# Patient Record
Sex: FEMALE | Race: WHITE | Employment: OTHER | URBAN - METROPOLITAN AREA
[De-identification: names, ages, dates, MRNs, and addresses within clinical notes are randomized per-mention and may not be internally consistent; named-entity substitution may affect disease eponyms.]

---

## 2017-02-28 PROCEDURE — 81001 URINALYSIS AUTO W/SCOPE: CPT | Performed by: INTERNAL MEDICINE

## 2017-02-28 PROCEDURE — 87086 URINE CULTURE/COLONY COUNT: CPT | Performed by: INTERNAL MEDICINE

## 2017-02-28 PROCEDURE — 82607 VITAMIN B-12: CPT | Performed by: INTERNAL MEDICINE

## 2018-02-26 PROCEDURE — 87086 URINE CULTURE/COLONY COUNT: CPT | Performed by: INTERNAL MEDICINE

## 2018-02-26 PROCEDURE — 81001 URINALYSIS AUTO W/SCOPE: CPT | Performed by: INTERNAL MEDICINE

## 2018-11-14 PROCEDURE — 81001 URINALYSIS AUTO W/SCOPE: CPT | Performed by: INTERNAL MEDICINE

## 2018-11-14 PROCEDURE — 87086 URINE CULTURE/COLONY COUNT: CPT | Performed by: INTERNAL MEDICINE

## 2019-07-19 PROCEDURE — 87086 URINE CULTURE/COLONY COUNT: CPT | Performed by: INTERNAL MEDICINE

## 2020-12-18 PROBLEM — R42 DIZZY: Status: ACTIVE | Noted: 2020-12-18

## 2020-12-18 PROBLEM — I95.0 IDIOPATHIC HYPOTENSION: Status: ACTIVE | Noted: 2020-12-18

## 2020-12-21 PROBLEM — R42 DIZZY: Status: RESOLVED | Noted: 2020-12-18 | Resolved: 2020-12-21

## 2020-12-21 PROBLEM — I95.0 IDIOPATHIC HYPOTENSION: Status: RESOLVED | Noted: 2020-12-18 | Resolved: 2020-12-21

## 2021-08-04 PROBLEM — R93.1 AGATSTON CAC SCORE, >400: Status: ACTIVE | Noted: 2021-08-04

## 2021-08-04 PROBLEM — J42 CHRONIC BRONCHITIS (HCC): Status: ACTIVE | Noted: 2021-08-04

## 2021-08-04 PROBLEM — I71.40 ABDOMINAL AORTIC ANEURYSM (AAA) WITHOUT RUPTURE (HCC): Status: ACTIVE | Noted: 2021-08-04

## 2021-08-04 PROBLEM — I71.4 ABDOMINAL AORTIC ANEURYSM (AAA) WITHOUT RUPTURE (HCC): Status: ACTIVE | Noted: 2021-08-04

## 2021-08-04 PROBLEM — S32.010A CLOSED COMPRESSION FRACTURE OF L1 VERTEBRA (HCC): Status: ACTIVE | Noted: 2021-08-04

## 2021-08-04 PROBLEM — F33.9 DEPRESSION, RECURRENT (HCC): Status: ACTIVE | Noted: 2021-08-04

## 2021-08-04 PROBLEM — J42 CHRONIC BRONCHITIS (HCC): Status: RESOLVED | Noted: 2021-08-04 | Resolved: 2021-08-04

## 2021-08-04 PROBLEM — I71.40 ABDOMINAL AORTIC ANEURYSM (AAA) WITHOUT RUPTURE: Status: ACTIVE | Noted: 2021-08-04

## 2022-04-14 ENCOUNTER — OFFICE VISIT (OUTPATIENT)
Dept: INTERNAL MEDICINE CLINIC | Facility: CLINIC | Age: 82
End: 2022-04-14
Payer: MEDICARE

## 2022-04-14 VITALS
HEART RATE: 87 BPM | OXYGEN SATURATION: 96 % | SYSTOLIC BLOOD PRESSURE: 132 MMHG | TEMPERATURE: 98 F | HEIGHT: 64 IN | DIASTOLIC BLOOD PRESSURE: 82 MMHG | BODY MASS INDEX: 25.1 KG/M2 | WEIGHT: 147 LBS

## 2022-04-14 DIAGNOSIS — E78.5 DYSLIPIDEMIA: ICD-10-CM

## 2022-04-14 DIAGNOSIS — Z13.0 SCREENING FOR IRON DEFICIENCY ANEMIA: ICD-10-CM

## 2022-04-14 DIAGNOSIS — E03.9 ACQUIRED HYPOTHYROIDISM: ICD-10-CM

## 2022-04-14 DIAGNOSIS — G89.29 CHRONIC NECK PAIN: ICD-10-CM

## 2022-04-14 DIAGNOSIS — E55.9 VITAMIN D DEFICIENCY: ICD-10-CM

## 2022-04-14 DIAGNOSIS — I25.10 CORONARY ARTERY DISEASE INVOLVING NATIVE CORONARY ARTERY OF NATIVE HEART WITHOUT ANGINA PECTORIS: Primary | ICD-10-CM

## 2022-04-14 DIAGNOSIS — I95.1 ORTHOSTATIC HYPOTENSION: ICD-10-CM

## 2022-04-14 DIAGNOSIS — Z13.29 SCREENING FOR THYROID DISORDER: ICD-10-CM

## 2022-04-14 DIAGNOSIS — M25.649 STIFFNESS OF HAND JOINT, UNSPECIFIED LATERALITY: ICD-10-CM

## 2022-04-14 DIAGNOSIS — F33.9 DEPRESSION, RECURRENT (HCC): ICD-10-CM

## 2022-04-14 DIAGNOSIS — Z13.220 SCREENING FOR LIPOID DISORDERS: ICD-10-CM

## 2022-04-14 DIAGNOSIS — M54.2 CHRONIC NECK PAIN: ICD-10-CM

## 2022-04-14 PROCEDURE — 3075F SYST BP GE 130 - 139MM HG: CPT | Performed by: INTERNAL MEDICINE

## 2022-04-14 PROCEDURE — 3079F DIAST BP 80-89 MM HG: CPT | Performed by: INTERNAL MEDICINE

## 2022-04-14 PROCEDURE — 3008F BODY MASS INDEX DOCD: CPT | Performed by: INTERNAL MEDICINE

## 2022-04-14 PROCEDURE — 99205 OFFICE O/P NEW HI 60 MIN: CPT | Performed by: INTERNAL MEDICINE

## 2022-04-14 RX ORDER — ATORVASTATIN CALCIUM 20 MG/1
20 TABLET, FILM COATED ORAL DAILY
Qty: 90 TABLET | Refills: 3 | Status: SHIPPED | OUTPATIENT
Start: 2022-04-14

## 2022-04-14 RX ORDER — OXYBUTYNIN CHLORIDE 5 MG/1
5 TABLET ORAL NIGHTLY
Qty: 30 TABLET | Refills: 1 | Status: SHIPPED | OUTPATIENT
Start: 2022-04-14

## 2022-04-14 RX ORDER — OXYBUTYNIN CHLORIDE 5 MG/1
5 TABLET ORAL DAILY
Qty: 30 TABLET | Refills: 1 | Status: SHIPPED | OUTPATIENT
Start: 2022-04-14 | End: 2022-04-14

## 2022-04-14 RX ORDER — FLUDROCORTISONE ACETATE 0.1 MG/1
0.1 TABLET ORAL DAILY
Qty: 90 TABLET | Refills: 3 | Status: SHIPPED | OUTPATIENT
Start: 2022-04-14

## 2022-04-14 RX ORDER — MIDODRINE HYDROCHLORIDE 2.5 MG/1
2.5 TABLET ORAL DAILY
Qty: 90 TABLET | Refills: 1 | Status: SHIPPED | OUTPATIENT
Start: 2022-04-14 | End: 2022-04-14

## 2022-04-14 RX ORDER — MIDODRINE HYDROCHLORIDE 2.5 MG/1
2.5 TABLET ORAL
Qty: 180 TABLET | Refills: 1 | Status: SHIPPED | OUTPATIENT
Start: 2022-04-14

## 2022-05-04 ENCOUNTER — HOSPITAL ENCOUNTER (OUTPATIENT)
Dept: GENERAL RADIOLOGY | Facility: HOSPITAL | Age: 82
Discharge: HOME | End: 2022-05-04
Attending: INTERNAL MEDICINE
Payer: MEDICARE

## 2022-05-04 ENCOUNTER — LAB ENCOUNTER (OUTPATIENT)
Dept: LAB | Facility: HOSPITAL | Age: 82
End: 2022-05-04
Attending: INTERNAL MEDICINE
Payer: MEDICARE

## 2022-05-04 DIAGNOSIS — G89.29 CHRONIC NECK PAIN: ICD-10-CM

## 2022-05-04 DIAGNOSIS — E55.9 VITAMIN D DEFICIENCY: ICD-10-CM

## 2022-05-04 DIAGNOSIS — Z13.0 SCREENING FOR IRON DEFICIENCY ANEMIA: ICD-10-CM

## 2022-05-04 DIAGNOSIS — M25.649 STIFFNESS OF HAND JOINT, UNSPECIFIED LATERALITY: ICD-10-CM

## 2022-05-04 DIAGNOSIS — E78.5 DYSLIPIDEMIA: ICD-10-CM

## 2022-05-04 DIAGNOSIS — I95.1 ORTHOSTATIC HYPOTENSION: ICD-10-CM

## 2022-05-04 DIAGNOSIS — M54.2 CHRONIC NECK PAIN: ICD-10-CM

## 2022-05-04 LAB
ALBUMIN SERPL-MCNC: 3.8 G/DL (ref 3.4–5)
ALBUMIN/GLOB SERPL: 1.2 {RATIO} (ref 1–2)
ALP LIVER SERPL-CCNC: 58 U/L
ALT SERPL-CCNC: 22 U/L
ANION GAP SERPL CALC-SCNC: 6 MMOL/L (ref 0–18)
AST SERPL-CCNC: 19 U/L (ref 15–37)
BASOPHILS # BLD AUTO: 0.08 X10(3) UL (ref 0–0.2)
BASOPHILS NFR BLD AUTO: 1 %
BILIRUB SERPL-MCNC: 0.4 MG/DL (ref 0.1–2)
BUN BLD-MCNC: 11 MG/DL (ref 7–18)
BUN/CREAT SERPL: 10 (ref 10–20)
CALCIUM BLD-MCNC: 9 MG/DL (ref 8.5–10.1)
CHLORIDE SERPL-SCNC: 108 MMOL/L (ref 98–112)
CHOLEST SERPL-MCNC: 187 MG/DL (ref ?–200)
CO2 SERPL-SCNC: 28 MMOL/L (ref 21–32)
CREAT BLD-MCNC: 1.1 MG/DL
DEPRECATED RDW RBC AUTO: 51.8 FL (ref 35.1–46.3)
EOSINOPHIL # BLD AUTO: 0.17 X10(3) UL (ref 0–0.7)
EOSINOPHIL NFR BLD AUTO: 2.1 %
ERYTHROCYTE [DISTWIDTH] IN BLOOD BY AUTOMATED COUNT: 13.7 % (ref 11–15)
FASTING PATIENT LIPID ANSWER: YES
FASTING STATUS PATIENT QL REPORTED: YES
GLOBULIN PLAS-MCNC: 3.3 G/DL (ref 2.8–4.4)
GLUCOSE BLD-MCNC: 98 MG/DL (ref 70–99)
HCT VFR BLD AUTO: 47.5 %
HDLC SERPL-MCNC: 81 MG/DL (ref 40–59)
HGB BLD-MCNC: 14.7 G/DL
IMM GRANULOCYTES # BLD AUTO: 0.03 X10(3) UL (ref 0–1)
IMM GRANULOCYTES NFR BLD: 0.4 %
LDLC SERPL CALC-MCNC: 88 MG/DL (ref ?–100)
LYMPHOCYTES # BLD AUTO: 2.38 X10(3) UL (ref 1–4)
LYMPHOCYTES NFR BLD AUTO: 29.3 %
MCH RBC QN AUTO: 31.2 PG (ref 26–34)
MCHC RBC AUTO-ENTMCNC: 30.9 G/DL (ref 31–37)
MCV RBC AUTO: 100.8 FL
MONOCYTES # BLD AUTO: 0.74 X10(3) UL (ref 0.1–1)
MONOCYTES NFR BLD AUTO: 9.1 %
NEUTROPHILS # BLD AUTO: 4.71 X10 (3) UL (ref 1.5–7.7)
NEUTROPHILS # BLD AUTO: 4.71 X10(3) UL (ref 1.5–7.7)
NEUTROPHILS NFR BLD AUTO: 58.1 %
NONHDLC SERPL-MCNC: 106 MG/DL (ref ?–130)
OSMOLALITY SERPL CALC.SUM OF ELEC: 293 MOSM/KG (ref 275–295)
PLATELET # BLD AUTO: 279 10(3)UL (ref 150–450)
POTASSIUM SERPL-SCNC: 3.7 MMOL/L (ref 3.5–5.1)
PROT SERPL-MCNC: 7.1 G/DL (ref 6.4–8.2)
RBC # BLD AUTO: 4.71 X10(6)UL
RHEUMATOID FACT SERPL-ACNC: <10 IU/ML (ref ?–15)
SODIUM SERPL-SCNC: 142 MMOL/L (ref 136–145)
TRIGL SERPL-MCNC: 105 MG/DL (ref 30–149)
TSI SER-ACNC: 3.4 MIU/ML (ref 0.36–3.74)
VIT D+METAB SERPL-MCNC: 45 NG/ML (ref 30–100)
VLDLC SERPL CALC-MCNC: 17 MG/DL (ref 0–30)
WBC # BLD AUTO: 8.1 X10(3) UL (ref 4–11)

## 2022-05-04 PROCEDURE — 73130 X-RAY EXAM OF HAND: CPT | Performed by: INTERNAL MEDICINE

## 2022-05-04 PROCEDURE — 86038 ANTINUCLEAR ANTIBODIES: CPT

## 2022-05-04 PROCEDURE — 72040 X-RAY EXAM NECK SPINE 2-3 VW: CPT | Performed by: INTERNAL MEDICINE

## 2022-05-04 PROCEDURE — 86039 ANTINUCLEAR ANTIBODIES (ANA): CPT

## 2022-05-04 PROCEDURE — 85025 COMPLETE CBC W/AUTO DIFF WBC: CPT

## 2022-05-04 PROCEDURE — 80053 COMPREHEN METABOLIC PANEL: CPT

## 2022-05-04 PROCEDURE — 86431 RHEUMATOID FACTOR QUANT: CPT

## 2022-05-04 PROCEDURE — 84443 ASSAY THYROID STIM HORMONE: CPT | Performed by: INTERNAL MEDICINE

## 2022-05-04 PROCEDURE — 36415 COLL VENOUS BLD VENIPUNCTURE: CPT | Performed by: INTERNAL MEDICINE

## 2022-05-04 PROCEDURE — 80061 LIPID PANEL: CPT

## 2022-05-04 PROCEDURE — 82306 VITAMIN D 25 HYDROXY: CPT

## 2022-05-05 LAB — NUCLEAR IGG TITR SER IF: POSITIVE {TITER}

## 2022-05-07 LAB — ANA NUCLEOLAR TITR SER IF: 80 {TITER}

## 2022-05-10 ENCOUNTER — TELEPHONE (OUTPATIENT)
Dept: INTERNAL MEDICINE CLINIC | Facility: CLINIC | Age: 82
End: 2022-05-10

## 2022-11-26 RX ORDER — MIDODRINE HYDROCHLORIDE 2.5 MG/1
2.5 TABLET ORAL
Qty: 60 TABLET | Refills: 0 | Status: SHIPPED | OUTPATIENT
Start: 2022-11-26

## 2022-12-08 ENCOUNTER — OFFICE VISIT (OUTPATIENT)
Dept: INTERNAL MEDICINE CLINIC | Facility: CLINIC | Age: 82
End: 2022-12-08
Payer: MEDICARE

## 2022-12-08 VITALS
DIASTOLIC BLOOD PRESSURE: 76 MMHG | SYSTOLIC BLOOD PRESSURE: 108 MMHG | OXYGEN SATURATION: 98 % | TEMPERATURE: 98 F | HEART RATE: 80 BPM | HEIGHT: 64 IN | WEIGHT: 171.63 LBS | BODY MASS INDEX: 29.3 KG/M2

## 2022-12-08 DIAGNOSIS — R53.82 CHRONIC FATIGUE: ICD-10-CM

## 2022-12-08 DIAGNOSIS — E55.9 VITAMIN D DEFICIENCY: ICD-10-CM

## 2022-12-08 DIAGNOSIS — F33.9 DEPRESSION, RECURRENT (HCC): ICD-10-CM

## 2022-12-08 DIAGNOSIS — I95.1 ORTHOSTATIC HYPOTENSION: ICD-10-CM

## 2022-12-08 DIAGNOSIS — E78.5 DYSLIPIDEMIA: ICD-10-CM

## 2022-12-08 DIAGNOSIS — Z00.00 ENCOUNTER FOR ANNUAL HEALTH EXAMINATION: ICD-10-CM

## 2022-12-08 DIAGNOSIS — R53.81 PHYSICAL DECONDITIONING: ICD-10-CM

## 2022-12-08 DIAGNOSIS — M25.649 STIFFNESS OF HAND JOINT, UNSPECIFIED LATERALITY: ICD-10-CM

## 2022-12-08 DIAGNOSIS — G89.29 CHRONIC NECK PAIN: ICD-10-CM

## 2022-12-08 DIAGNOSIS — M54.2 CHRONIC NECK PAIN: ICD-10-CM

## 2022-12-08 DIAGNOSIS — Z78.0 POSTMENOPAUSAL: ICD-10-CM

## 2022-12-08 DIAGNOSIS — Z13.29 SCREENING FOR THYROID DISORDER: ICD-10-CM

## 2022-12-08 DIAGNOSIS — E03.9 ACQUIRED HYPOTHYROIDISM: ICD-10-CM

## 2022-12-08 DIAGNOSIS — Z13.0 SCREENING FOR DEFICIENCY ANEMIA: ICD-10-CM

## 2022-12-08 DIAGNOSIS — I25.10 CORONARY ARTERY DISEASE INVOLVING NATIVE CORONARY ARTERY OF NATIVE HEART WITHOUT ANGINA PECTORIS: ICD-10-CM

## 2022-12-08 DIAGNOSIS — Z00.00 ANNUAL PHYSICAL EXAM: Primary | ICD-10-CM

## 2022-12-08 DIAGNOSIS — N18.31 STAGE 3A CHRONIC KIDNEY DISEASE (HCC): ICD-10-CM

## 2022-12-08 RX ORDER — OXYBUTYNIN CHLORIDE 5 MG/1
10 TABLET ORAL NIGHTLY
Qty: 180 TABLET | Refills: 3 | Status: SHIPPED | OUTPATIENT
Start: 2022-12-08

## 2022-12-08 NOTE — PATIENT INSTRUCTIONS
- You were seen in clinic for regular annual check-up. We have ordered labs for you and we will call you with the results. Please obtain the bloodwork fasting for 10 hours. OK to drink water the day of your blood draw  -We are happy to hear that your orthostatic hypotension has improved. Continue with using compression stockings, getting up at a slower rate, use of fludrocortisone and midodrine. Be sure to take your second dose of midodrine before 2 PM  -Today, we focused on your fatigue. This can be due to multiple factors even outside of aging. We should rule out secondary causes of the blood work such as thyroid levels being low, vitamin deficiencies, decrease in blood counts. Your blood levels from the springtime all look good, lets make sure nothing is changed  - There is a clinical suspicion that inadequate sleep is the driving force for your fatigue. Lets increase your oxybutynin to 10 mg (2 tablets) once a day. The goal here is to decrease her urinary frequency so we have much better form of sleep  - We will send a referral to home health care as you would benefit from home health nursing with medication management, physical therapy, and Occupational Therapy. 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 Lets continue with    -Acetaminophen 500-650 mg every 4-6 hours as needed for pain relief  - Physical therapy can be considered in the future, which can be done in the home setting.  -For more severe pain, notify us as we can consider alternative medications    -Please continue checking your blood pressures at home and notify us if they are increasing   - Please continue following up with Dr. Aldo Bello of cardiology  - Please have your DEXA scan completed  - Please continue to eat a varied diet including recommended servings of vegetables, fruits, and low fat dairy.  Minimize high saturated fats (such as fast foods) and high sugar intake (such as soda)  - We recommend 150 minutes of moderate intensity exercise (brisk walking, swimming) weekly to maintain your current weight. Targeted weight loss will require more vigorous exercise or more than 150 minutes/week. Return to clinic in 3-6 months for follow-up    [address]   Tests on this list are recommended by your physician but may not be covered, or covered at this frequency, by your insurer. Please check with your insurance carrier before scheduling to verify coverage.    PREVENTATIVE SERVICES FREQUENCY &  COVERAGE DETAILS LAST COMPLETION DATE   Diabetes Screening    Fasting Blood Sugar /  Glucose    One screening every 12 months if never tested or if previously tested but not diagnosed with pre-diabetes   One screening every 6 months if diagnosed with pre-diabetes Lab Results   Component Value Date    GLUCOSE 84 12/22/2014    GLU 98 05/04/2022        Cardiovascular Disease Screening    Lipid Panel  Cholesterol  Lipoprotein (HDL)  Triglycerides Covered every 5 years for all Medicare beneficiaries without apparent signs or symptoms of cardiovascular disease Lab Results   Component Value Date    CHOLEST 187 05/04/2022    HDL 81 (H) 05/04/2022    LDL 88 05/04/2022    TRIG 105 05/04/2022         Electrocardiogram (EKG)   Covered if needed at Welcome to Medicare, and non-screening if indicated for medical reasons 12/22/2020      Ultrasound Screening for Abdominal Aortic Aneurysm (AAA) Covered once in a lifetime for one of the following risk factors    Men who are 73-68 years old and have ever smoked    Anyone with a family history -     Colorectal Cancer Screening  Covered for ages 52-80; only need ONE of the following:    Colonoscopy   Covered every 10 years    Covered every 2 years if patient is at high risk or previous colonoscopy was abnormal -    No recommendations at this time    Flexible Sigmoidoscopy   Covered every 4 years -    Fecal Occult Blood Test Covered annually -   Bone Density Screening Bone density screening    Covered every 2 years after age 72 if diagnosed with risk of osteoporosis or estrogen deficiency. Covered yearly for long-term glucocorticoid medication use (Steroids) No results found for this or any previous visit. DEXA Scan Never done   Pap and Pelvic    Pap   Covered every 2 years for women at normal risk;  Annually if at high risk -  No recommendations at this time    Chlamydia Annually if high risk -  No recommendations at this time   Screening Mammogram    Mammogram     Recommend annually for all female patients aged 36 and older    One baseline mammogram covered for patients aged 32-38 -    No recommendations at this time    Immunizations    Influenza Covered once per flu season  Please get every year -  Influenza Vaccine(1) due on 10/01/2022    Pneumococcal Each vaccine (Ybcnqzs64 & Yjxngfkty64) covered once after 72 Prevnar 13: 10/26/2014    Rtigfqtio80: 02/26/2018     No recommendations at this time    Hepatitis B One screening covered for patients with certain risk factors   -  No recommendations at this time    Tetanus Toxoid Not covered by Medicare Part B unless medically necessary (cut with metal); may be covered with your pharmacy prescription benefits -    Tetanus, Diptheria and Pertusis TD and TDaP Not covered by Medicare Part B -  No recommendations at this time    Zoster Not covered by Medicare Part B; may be covered with your pharmacy  prescription benefits -  Zoster Vaccines(1 of 2) Never done       Annual Monitoring of Persistent Medications (ACE/ARB, digoxin diuretics, anticonvulsants)    Potassium Annually Lab Results   Component Value Date    K 3.7 05/04/2022         Creatinine   Annually Lab Results   Component Value Date    CREATSERUM 1.10 (H) 05/04/2022         BUN Annually Lab Results   Component Value Date    BUN 11 05/04/2022       Drug Serum Conc Annually No results found for: DIGOXIN, DIG, VALP

## 2022-12-12 ENCOUNTER — TELEPHONE (OUTPATIENT)
Dept: INTERNAL MEDICINE CLINIC | Facility: CLINIC | Age: 82
End: 2022-12-12

## 2022-12-12 DIAGNOSIS — R53.81 PHYSICAL DECONDITIONING: ICD-10-CM

## 2022-12-12 DIAGNOSIS — I95.1 ORTHOSTATIC HYPOTENSION: Primary | ICD-10-CM

## 2022-12-12 DIAGNOSIS — R29.6 RECURRENT FALLS: ICD-10-CM

## 2022-12-12 NOTE — TELEPHONE ENCOUNTER
Jose Birch is calling back after talking with the patient's insurance.  Insurance informed Jose Birch that they work best with:    [facility]  [address] [place], [facility] [address]  Fax # [phone]   #  [phone]

## 2022-12-12 NOTE — TELEPHONE ENCOUNTER
Home health orders faxed to [facility] at [phone]. Confirmation received. Left voicemail for patient's grandson, Jose Birch that orders were faxed.

## 2022-12-12 NOTE — TELEPHONE ENCOUNTER
Pt's grandson Pancho Aldana called. Looks like the [facility] services that Dr. Alfonso Nissen ordered are not covered by her insurance. They do not cover [facility]. Pancho Aldana is going to contact her insurance company to find out which [facility] agency's they do cover and he will get back to us.

## 2023-01-06 ENCOUNTER — TELEPHONE (OUTPATIENT)
Dept: INTERNAL MEDICINE CLINIC | Facility: CLINIC | Age: 83
End: 2023-01-06

## 2023-01-06 NOTE — TELEPHONE ENCOUNTER
Received fax from [facility] requesting Face to face notes. Last OV note faxed to [phone]. Confirmation received.

## 2023-01-09 NOTE — TELEPHONE ENCOUNTER
Faxed back order for rolling walker to [facility] [phone]. Confirmation received.

## 2023-01-10 ENCOUNTER — TELEPHONE (OUTPATIENT)
Dept: INTERNAL MEDICINE CLINIC | Facility: CLINIC | Age: 83
End: 2023-01-10

## 2023-01-11 NOTE — TELEPHONE ENCOUNTER
DME order and office note from 12/8/22 faxed to Katherine at [phone]-confirmation received - to scanning

## 2023-01-12 RX ORDER — FLUDROCORTISONE ACETATE 0.1 MG/1
0.1 TABLET ORAL DAILY
Qty: 90 TABLET | Refills: 3 | OUTPATIENT
Start: 2023-01-12

## 2023-01-26 DIAGNOSIS — I25.10 CORONARY ARTERY DISEASE INVOLVING NATIVE CORONARY ARTERY OF NATIVE HEART WITHOUT ANGINA PECTORIS: ICD-10-CM

## 2023-01-27 RX ORDER — ATORVASTATIN CALCIUM 20 MG/1
20 TABLET, FILM COATED ORAL DAILY
Qty: 90 TABLET | Refills: 3 | OUTPATIENT
Start: 2023-01-27

## 2023-02-02 ENCOUNTER — MED REC SCAN ONLY (OUTPATIENT)
Dept: INTERNAL MEDICINE CLINIC | Facility: CLINIC | Age: 83
End: 2023-02-02

## 2023-05-01 DIAGNOSIS — I25.10 CORONARY ARTERY DISEASE INVOLVING NATIVE CORONARY ARTERY OF NATIVE HEART WITHOUT ANGINA PECTORIS: ICD-10-CM

## 2023-05-03 RX ORDER — ATORVASTATIN CALCIUM 20 MG/1
20 TABLET, FILM COATED ORAL DAILY
Qty: 90 TABLET | Refills: 3 | Status: SHIPPED | OUTPATIENT
Start: 2023-05-03

## 2023-05-03 RX ORDER — FLUDROCORTISONE ACETATE 0.1 MG/1
0.1 TABLET ORAL DAILY
Qty: 90 TABLET | Refills: 3 | Status: SHIPPED | OUTPATIENT
Start: 2023-05-03

## 2023-07-13 ENCOUNTER — TELEPHONE (OUTPATIENT)
Dept: INTERNAL MEDICINE CLINIC | Facility: CLINIC | Age: 83
End: 2023-07-13

## 2023-07-13 NOTE — TELEPHONE ENCOUNTER
Kayla Caputo is calling to schedule an appointment with Dr Samir Hernandez. Darius Neal states the patient has had a lack of energy when getting out of bed and refuses to try to get out of bed, worsen depression, and having accidents in bed. Darius Neal was hoping that the patient could see Dr Samir Hernandez some time soon. Darius Neal has agreed to scheduling an appointment with Dr Samir Hernandez on 8/17/2023 but would like to come in sooner. Darius Neal can be reached at [phone].

## 2023-07-13 NOTE — TELEPHONE ENCOUNTER
Spoke with Lidia Zavala states patient had diarrhea for 2-3 days a few days ago. Patient does not like to get out of beds and refused to take depression medication. Patient recently wearing depends, which is helping with the incontinence issues. Wants to discuss PT/OT for patient again as it helped her before. Lidia Zavala wants doctor to discuss her depression issues and medication again. Patient drinks Boost daily but not eating that much, has not lost any weight. Has not had any episodes of diarrhea in the last few days. Denies fever, blood in the stool, urinary frequency, urgency, nausea, vomiting, abdominal pain, hematuria. Patient lives alone and Lidia Zavala is also hopping to discuss [place]. Patient has appointment 08/17/23 Lidia Zavala thinks she can wait until then to discuss these issues. To Dr. Ant Connors as Beau Mobley for upcoming appointment.

## 2023-07-13 NOTE — TELEPHONE ENCOUNTER
Ruth Crouch called back. He asks that we disregard the below request. Patient is okay to wait until August to see Dr Trevin Rincon.

## 2023-08-14 ENCOUNTER — TELEPHONE (OUTPATIENT)
Dept: INTERNAL MEDICINE CLINIC | Facility: CLINIC | Age: 83
End: 2023-08-14

## 2023-08-14 DIAGNOSIS — R82.90 ABNORMAL URINE: Primary | ICD-10-CM

## 2023-08-14 NOTE — TELEPHONE ENCOUNTER
Spoke with Geri Lopez per HIPAA states patient had unwitnessed  fall on 8/11 and was on the floor for about 12 hours. He states patient told him she did not hit her head or lose consciousness. She said she tripped over her  shoe and landed on her bottom on carpeted floor. Paramedics were called but patient refused to go to ER. Geri Lopez states patient said she did not get dizzy or lightheaded prior to falling. He also, states patient gets confused and is not always a reliable historian. Patient lives alone. Geri Lopez states patient is not complaining of any urinary issues at this time. He states that patient's urine has a strong odor and he noticed that she is using more pull-up that normal. Patient has not had any fever, hematuria, no c/o back/flank pain, new confusion. Geri Lopez asking if UA can be ordered for patient prior to 8/17 appointment with Dr. Meera Mcdonnell. Recommended ER for evaluation of fall as patient is poor historian, on the floor for 12 hours, and strong urinary odor. Geri Lopez states he is a critical care nurse and he does not think patient needs ER at this time. He states he will keep an eye on her for any decline. To Dr. Stanton Mann please advise.  UA pended    [address]

## 2023-08-14 NOTE — TELEPHONE ENCOUNTER
Kim Gant called  Dallas Carranza reports patient had a fall on Friday Aug 11  Pt was on the floor for more than 12 hours / paramedics were called   Vitals were taken, pt did not hit her head    Pt has become more incontinent / urine has an odor   Asks for order to have a urine test done prior to her appt on 8/17 with Dr Adan Robertson would like obtain urine sample at pt's home and bring to the [place] [id number]     Call back # [phone]

## 2023-08-14 NOTE — TELEPHONE ENCOUNTER
Please let Tomás Sahu know that I received his message in the absence of Dr. Logan Hernandez. I appreciate his evaluation. I did order both urinalysis and urine culture so we can get a complete picture of what may be happening with her urine if anything. .  Orders in place.

## 2023-08-16 NOTE — PATIENT INSTRUCTIONS
You are seen in clinic today for follow-up. Today, we did focus on your recent fall which may be due to multiple factors including fatigue, possibly urinary infection.  - We should check blood test as part of a general follow-up as well as prior to your next physical examination  - We did also discuss depression as a cause of your chronic fatigue. We should consider starting medications:    Effexor 37.5 mg once a day. May take 4-6 weeks to take full effect  - Monitor for any side effects  - Please check in with us via [employer] in about 3-4 weeks    For your orthostatic hypotension, we are increasing to 25 mg twice a day  - Carefully check your blood pressures periodically at home, notify us if they are increasing his midodrine can increase blood pressure even during times of rest  - We are checking some fasting blood work. This should be done between the hours of 8 AM-9 AM fasting for 10 hours.   Specifically we are checking your cholesterol and vitamin levels    We are setting up home health care for you, PT/OT to assist with your balance and chronic neck pain.  -We will ask for assistance with shower aid as well    - Return to clinic in 6-8 weeks for Medicare annual physical examination

## 2023-08-17 ENCOUNTER — LAB ENCOUNTER (OUTPATIENT)
Dept: LAB | Age: 83
End: 2023-08-17
Attending: INTERNAL MEDICINE
Payer: MEDICARE

## 2023-08-17 ENCOUNTER — TELEPHONE (OUTPATIENT)
Dept: INTERNAL MEDICINE CLINIC | Facility: CLINIC | Age: 83
End: 2023-08-17

## 2023-08-17 ENCOUNTER — OFFICE VISIT (OUTPATIENT)
Dept: INTERNAL MEDICINE CLINIC | Facility: CLINIC | Age: 83
End: 2023-08-17

## 2023-08-17 VITALS
SYSTOLIC BLOOD PRESSURE: 110 MMHG | BODY MASS INDEX: 25.27 KG/M2 | TEMPERATURE: 98 F | OXYGEN SATURATION: 96 % | WEIGHT: 148 LBS | HEIGHT: 64 IN | DIASTOLIC BLOOD PRESSURE: 62 MMHG | HEART RATE: 82 BPM

## 2023-08-17 DIAGNOSIS — R53.82 CHRONIC FATIGUE: ICD-10-CM

## 2023-08-17 DIAGNOSIS — Z12.31 ENCOUNTER FOR SCREENING MAMMOGRAM FOR MALIGNANT NEOPLASM OF BREAST: ICD-10-CM

## 2023-08-17 DIAGNOSIS — E78.5 DYSLIPIDEMIA: ICD-10-CM

## 2023-08-17 DIAGNOSIS — R29.6 RECURRENT FALLS: Primary | ICD-10-CM

## 2023-08-17 DIAGNOSIS — Z13.29 SCREENING FOR THYROID DISORDER: ICD-10-CM

## 2023-08-17 DIAGNOSIS — I95.1 ORTHOSTATIC HYPOTENSION: ICD-10-CM

## 2023-08-17 DIAGNOSIS — N18.31 STAGE 3A CHRONIC KIDNEY DISEASE (HCC): ICD-10-CM

## 2023-08-17 DIAGNOSIS — E55.9 VITAMIN D DEFICIENCY: ICD-10-CM

## 2023-08-17 DIAGNOSIS — E03.9 ACQUIRED HYPOTHYROIDISM: ICD-10-CM

## 2023-08-17 DIAGNOSIS — R82.90 ABNORMAL URINE: ICD-10-CM

## 2023-08-17 DIAGNOSIS — Z13.0 SCREENING FOR DEFICIENCY ANEMIA: ICD-10-CM

## 2023-08-17 DIAGNOSIS — Z00.00 ANNUAL PHYSICAL EXAM: ICD-10-CM

## 2023-08-17 DIAGNOSIS — F33.9 DEPRESSION, RECURRENT (HCC): ICD-10-CM

## 2023-08-17 DIAGNOSIS — Z13.1 SCREENING FOR DIABETES MELLITUS: ICD-10-CM

## 2023-08-17 DIAGNOSIS — N39.3 STRESS INCONTINENCE OF URINE: ICD-10-CM

## 2023-08-17 DIAGNOSIS — R29.6 RECURRENT FALLS: ICD-10-CM

## 2023-08-17 LAB
ALBUMIN SERPL-MCNC: 3.7 G/DL (ref 3.4–5)
ALBUMIN/GLOB SERPL: 1 {RATIO} (ref 1–2)
ALP LIVER SERPL-CCNC: 56 U/L
ALT SERPL-CCNC: 29 U/L
ANION GAP SERPL CALC-SCNC: 7 MMOL/L (ref 0–18)
AST SERPL-CCNC: 31 U/L (ref 15–37)
BASOPHILS # BLD AUTO: 0.08 X10(3) UL (ref 0–0.2)
BASOPHILS NFR BLD AUTO: 1 %
BILIRUB SERPL-MCNC: 0.7 MG/DL (ref 0.1–2)
BUN BLD-MCNC: 10 MG/DL (ref 7–18)
BUN/CREAT SERPL: 8.8 (ref 10–20)
CALCIUM BLD-MCNC: 10.1 MG/DL (ref 8.5–10.1)
CHLORIDE SERPL-SCNC: 108 MMOL/L (ref 98–112)
CHOLEST SERPL-MCNC: 206 MG/DL (ref ?–200)
CO2 SERPL-SCNC: 26 MMOL/L (ref 21–32)
CORTIS SERPL-MCNC: 16.8 UG/DL
CREAT BLD-MCNC: 1.14 MG/DL
DEPRECATED RDW RBC AUTO: 48.9 FL (ref 35.1–46.3)
EGFRCR SERPLBLD CKD-EPI 2021: 48 ML/MIN/1.73M2 (ref 60–?)
EOSINOPHIL # BLD AUTO: 0.16 X10(3) UL (ref 0–0.7)
EOSINOPHIL NFR BLD AUTO: 2 %
ERYTHROCYTE [DISTWIDTH] IN BLOOD BY AUTOMATED COUNT: 13.7 % (ref 11–15)
FASTING PATIENT LIPID ANSWER: YES
FASTING STATUS PATIENT QL REPORTED: YES
GLOBULIN PLAS-MCNC: 3.6 G/DL (ref 2.8–4.4)
GLUCOSE BLD-MCNC: 96 MG/DL (ref 70–99)
HCT VFR BLD AUTO: 48.4 %
HDLC SERPL-MCNC: 70 MG/DL (ref 40–59)
HGB BLD-MCNC: 15.6 G/DL
IMM GRANULOCYTES # BLD AUTO: 0.02 X10(3) UL (ref 0–1)
IMM GRANULOCYTES NFR BLD: 0.3 %
LDLC SERPL CALC-MCNC: 112 MG/DL (ref ?–100)
LYMPHOCYTES # BLD AUTO: 2.09 X10(3) UL (ref 1–4)
LYMPHOCYTES NFR BLD AUTO: 26.2 %
MCH RBC QN AUTO: 31.2 PG (ref 26–34)
MCHC RBC AUTO-ENTMCNC: 32.2 G/DL (ref 31–37)
MCV RBC AUTO: 96.8 FL
MONOCYTES # BLD AUTO: 0.68 X10(3) UL (ref 0.1–1)
MONOCYTES NFR BLD AUTO: 8.5 %
NEUTROPHILS # BLD AUTO: 4.95 X10 (3) UL (ref 1.5–7.7)
NEUTROPHILS # BLD AUTO: 4.95 X10(3) UL (ref 1.5–7.7)
NEUTROPHILS NFR BLD AUTO: 62 %
NONHDLC SERPL-MCNC: 136 MG/DL (ref ?–130)
OSMOLALITY SERPL CALC.SUM OF ELEC: 291 MOSM/KG (ref 275–295)
PLATELET # BLD AUTO: 249 10(3)UL (ref 150–450)
POTASSIUM SERPL-SCNC: 4 MMOL/L (ref 3.5–5.1)
PROT SERPL-MCNC: 7.3 G/DL (ref 6.4–8.2)
RBC # BLD AUTO: 5 X10(6)UL
SODIUM SERPL-SCNC: 141 MMOL/L (ref 136–145)
T4 FREE SERPL-MCNC: 1 NG/DL (ref 0.8–1.7)
TRIGL SERPL-MCNC: 140 MG/DL (ref 30–149)
TSI SER-ACNC: 3.86 MIU/ML (ref 0.36–3.74)
VIT B12 SERPL-MCNC: 1861 PG/ML (ref 193–986)
VIT D+METAB SERPL-MCNC: 66.3 NG/ML (ref 30–100)
VLDLC SERPL CALC-MCNC: 24 MG/DL (ref 0–30)
WBC # BLD AUTO: 8 X10(3) UL (ref 4–11)

## 2023-08-17 PROCEDURE — 84439 ASSAY OF FREE THYROXINE: CPT

## 2023-08-17 PROCEDURE — 82607 VITAMIN B-12: CPT

## 2023-08-17 PROCEDURE — 3008F BODY MASS INDEX DOCD: CPT | Performed by: INTERNAL MEDICINE

## 2023-08-17 PROCEDURE — 80053 COMPREHEN METABOLIC PANEL: CPT

## 2023-08-17 PROCEDURE — 80061 LIPID PANEL: CPT

## 2023-08-17 PROCEDURE — 85025 COMPLETE CBC W/AUTO DIFF WBC: CPT

## 2023-08-17 PROCEDURE — 99215 OFFICE O/P EST HI 40 MIN: CPT | Performed by: INTERNAL MEDICINE

## 2023-08-17 PROCEDURE — 82306 VITAMIN D 25 HYDROXY: CPT

## 2023-08-17 PROCEDURE — 1159F MED LIST DOCD IN RCRD: CPT | Performed by: INTERNAL MEDICINE

## 2023-08-17 PROCEDURE — 82533 TOTAL CORTISOL: CPT

## 2023-08-17 PROCEDURE — 84443 ASSAY THYROID STIM HORMONE: CPT

## 2023-08-17 PROCEDURE — 36415 COLL VENOUS BLD VENIPUNCTURE: CPT

## 2023-08-17 PROCEDURE — 3078F DIAST BP <80 MM HG: CPT | Performed by: INTERNAL MEDICINE

## 2023-08-17 PROCEDURE — 1125F AMNT PAIN NOTED PAIN PRSNT: CPT | Performed by: INTERNAL MEDICINE

## 2023-08-17 PROCEDURE — 1160F RVW MEDS BY RX/DR IN RCRD: CPT | Performed by: INTERNAL MEDICINE

## 2023-08-17 PROCEDURE — 3074F SYST BP LT 130 MM HG: CPT | Performed by: INTERNAL MEDICINE

## 2023-08-17 RX ORDER — LEVOTHYROXINE SODIUM 88 UG/1
88 TABLET ORAL
Qty: 90 TABLET | Refills: 3 | Status: SHIPPED | OUTPATIENT
Start: 2023-08-17

## 2023-08-17 RX ORDER — VENLAFAXINE HYDROCHLORIDE 37.5 MG/1
37.5 CAPSULE, EXTENDED RELEASE ORAL DAILY
Qty: 90 CAPSULE | Refills: 3 | Status: SHIPPED | OUTPATIENT
Start: 2023-08-17

## 2023-08-17 RX ORDER — MIDODRINE HYDROCHLORIDE 5 MG/1
5 TABLET ORAL
Qty: 180 TABLET | Refills: 3 | Status: SHIPPED | OUTPATIENT
Start: 2023-08-17

## 2023-08-17 NOTE — TELEPHONE ENCOUNTER
Please notify the patient or the patient's grandson Ronit Huber that the blood work did show low thyroid levels that we need to adjust her medications:    Increase to levothyroxine 88 mcg once a day    We will need to repeat some labs at the next visit to ensure thyroid levels normalize. Hopefully this will help with her energy levels over time.

## 2023-08-17 NOTE — TELEPHONE ENCOUNTER
Spoke to patients lillian woods per HIPAA and advised per MD message below, palak verbalized understanding and agreed to changes.

## 2023-08-24 ENCOUNTER — TELEPHONE (OUTPATIENT)
Dept: INTERNAL MEDICINE CLINIC | Facility: CLINIC | Age: 83
End: 2023-08-24

## 2023-08-24 NOTE — TELEPHONE ENCOUNTER
Mingo Ghotra called, pt saw Dr Zoey Daily on 8/17     [facility] orders were not received     Asks that they are re sent to [address] [place] [address] [facility] [phone]

## 2023-08-25 ENCOUNTER — HOSPITAL ENCOUNTER (OUTPATIENT)
Facility: HOSPITAL | Age: 83
Setting detail: OBSERVATION
Discharge: NURSING, REHAB OR LONG-TERM CARE | End: 2023-08-28
Attending: STUDENT IN AN ORGANIZED HEALTH CARE EDUCATION/TRAINING PROGRAM | Admitting: INTERNAL MEDICINE
Payer: MEDICARE

## 2023-08-25 ENCOUNTER — APPOINTMENT (OUTPATIENT)
Dept: GENERAL RADIOLOGY | Facility: HOSPITAL | Age: 83
End: 2023-08-25
Attending: INTERNAL MEDICINE
Payer: MEDICARE

## 2023-08-25 ENCOUNTER — APPOINTMENT (OUTPATIENT)
Dept: CV DIAGNOSTICS | Facility: HOSPITAL | Age: 83
End: 2023-08-25
Attending: INTERNAL MEDICINE
Payer: MEDICARE

## 2023-08-25 ENCOUNTER — APPOINTMENT (OUTPATIENT)
Dept: GENERAL RADIOLOGY | Facility: HOSPITAL | Age: 83
End: 2023-08-25
Attending: STUDENT IN AN ORGANIZED HEALTH CARE EDUCATION/TRAINING PROGRAM
Payer: MEDICARE

## 2023-08-25 DIAGNOSIS — R53.1 WEAKNESS GENERALIZED: ICD-10-CM

## 2023-08-25 DIAGNOSIS — R19.7 DIARRHEA, UNSPECIFIED TYPE: Primary | ICD-10-CM

## 2023-08-25 DIAGNOSIS — I95.1 ORTHOSTATIC HYPOTENSION: ICD-10-CM

## 2023-08-25 DIAGNOSIS — R62.7 FAILURE TO THRIVE IN ADULT: ICD-10-CM

## 2023-08-25 LAB
ALBUMIN SERPL-MCNC: 3.5 G/DL (ref 3.4–5)
ALBUMIN/GLOB SERPL: 0.9 {RATIO} (ref 1–2)
ALP LIVER SERPL-CCNC: 60 U/L
ALT SERPL-CCNC: 25 U/L
ANION GAP SERPL CALC-SCNC: 5 MMOL/L (ref 0–18)
AST SERPL-CCNC: 26 U/L (ref 15–37)
BASOPHILS # BLD AUTO: 0.06 X10(3) UL (ref 0–0.2)
BASOPHILS NFR BLD AUTO: 0.7 %
BILIRUB SERPL-MCNC: 0.6 MG/DL (ref 0.1–2)
BILIRUB UR QL: NEGATIVE
BUN BLD-MCNC: 9 MG/DL (ref 7–18)
BUN/CREAT SERPL: 7.6 (ref 10–20)
C DIFF TOX B STL QL: NEGATIVE
CALCIUM BLD-MCNC: 9.2 MG/DL (ref 8.5–10.1)
CHLORIDE SERPL-SCNC: 109 MMOL/L (ref 98–112)
CO2 SERPL-SCNC: 28 MMOL/L (ref 21–32)
CREAT BLD-MCNC: 1.18 MG/DL
DEPRECATED RDW RBC AUTO: 48 FL (ref 35.1–46.3)
EGFRCR SERPLBLD CKD-EPI 2021: 46 ML/MIN/1.73M2 (ref 60–?)
EOSINOPHIL # BLD AUTO: 0.13 X10(3) UL (ref 0–0.7)
EOSINOPHIL NFR BLD AUTO: 1.4 %
ERYTHROCYTE [DISTWIDTH] IN BLOOD BY AUTOMATED COUNT: 13.4 % (ref 11–15)
GLOBULIN PLAS-MCNC: 3.7 G/DL (ref 2.8–4.4)
GLUCOSE BLD-MCNC: 107 MG/DL (ref 70–99)
GLUCOSE UR-MCNC: NORMAL MG/DL
HCT VFR BLD AUTO: 49.4 %
HGB BLD-MCNC: 16 G/DL
HYALINE CASTS #/AREA URNS AUTO: PRESENT /LPF
IMM GRANULOCYTES # BLD AUTO: 0.03 X10(3) UL (ref 0–1)
IMM GRANULOCYTES NFR BLD: 0.3 %
KETONES UR-MCNC: 10 MG/DL
LEUKOCYTE ESTERASE UR QL STRIP.AUTO: 25
LYMPHOCYTES # BLD AUTO: 2.57 X10(3) UL (ref 1–4)
LYMPHOCYTES NFR BLD AUTO: 28.3 %
MAGNESIUM SERPL-MCNC: 2.1 MG/DL (ref 1.6–2.6)
MCH RBC QN AUTO: 31.2 PG (ref 26–34)
MCHC RBC AUTO-ENTMCNC: 32.4 G/DL (ref 31–37)
MCV RBC AUTO: 96.3 FL
MONOCYTES # BLD AUTO: 0.89 X10(3) UL (ref 0.1–1)
MONOCYTES NFR BLD AUTO: 9.8 %
NEUTROPHILS # BLD AUTO: 5.41 X10 (3) UL (ref 1.5–7.7)
NEUTROPHILS # BLD AUTO: 5.41 X10(3) UL (ref 1.5–7.7)
NEUTROPHILS NFR BLD AUTO: 59.5 %
NITRITE UR QL STRIP.AUTO: NEGATIVE
OSMOLALITY SERPL CALC.SUM OF ELEC: 293 MOSM/KG (ref 275–295)
PH UR: 5.5 [PH] (ref 5–8)
PLATELET # BLD AUTO: 265 10(3)UL (ref 150–450)
POTASSIUM SERPL-SCNC: 3.6 MMOL/L (ref 3.5–5.1)
PROT SERPL-MCNC: 7.2 G/DL (ref 6.4–8.2)
PROT UR-MCNC: 20 MG/DL
RBC # BLD AUTO: 5.13 X10(6)UL
SODIUM SERPL-SCNC: 142 MMOL/L (ref 136–145)
SP GR UR STRIP: 1.01 (ref 1–1.03)
UROBILINOGEN UR STRIP-ACNC: NORMAL
WBC # BLD AUTO: 9.1 X10(3) UL (ref 4–11)

## 2023-08-25 PROCEDURE — 93306 TTE W/DOPPLER COMPLETE: CPT | Performed by: INTERNAL MEDICINE

## 2023-08-25 PROCEDURE — 99223 1ST HOSP IP/OBS HIGH 75: CPT | Performed by: INTERNAL MEDICINE

## 2023-08-25 PROCEDURE — 71045 X-RAY EXAM CHEST 1 VIEW: CPT | Performed by: STUDENT IN AN ORGANIZED HEALTH CARE EDUCATION/TRAINING PROGRAM

## 2023-08-25 PROCEDURE — 72040 X-RAY EXAM NECK SPINE 2-3 VW: CPT | Performed by: INTERNAL MEDICINE

## 2023-08-25 RX ORDER — BISACODYL 10 MG
10 SUPPOSITORY, RECTAL RECTAL
Status: DISCONTINUED | OUTPATIENT
Start: 2023-08-25 | End: 2023-08-28

## 2023-08-25 RX ORDER — LEVOTHYROXINE SODIUM 88 UG/1
88 TABLET ORAL
Status: DISCONTINUED | OUTPATIENT
Start: 2023-08-25 | End: 2023-08-28

## 2023-08-25 RX ORDER — ENEMA 19; 7 G/133ML; G/133ML
1 ENEMA RECTAL ONCE AS NEEDED
Status: DISCONTINUED | OUTPATIENT
Start: 2023-08-25 | End: 2023-08-27

## 2023-08-25 RX ORDER — ONDANSETRON 2 MG/ML
4 INJECTION INTRAMUSCULAR; INTRAVENOUS EVERY 6 HOURS PRN
Status: DISCONTINUED | OUTPATIENT
Start: 2023-08-25 | End: 2023-08-28

## 2023-08-25 RX ORDER — MIDODRINE HYDROCHLORIDE 5 MG/1
5 TABLET ORAL
Status: DISCONTINUED | OUTPATIENT
Start: 2023-08-25 | End: 2023-08-28

## 2023-08-25 RX ORDER — HEPARIN SODIUM 5000 [USP'U]/ML
5000 INJECTION, SOLUTION INTRAVENOUS; SUBCUTANEOUS EVERY 12 HOURS SCHEDULED
Status: DISCONTINUED | OUTPATIENT
Start: 2023-08-25 | End: 2023-08-28

## 2023-08-25 RX ORDER — VENLAFAXINE HYDROCHLORIDE 37.5 MG/1
37.5 CAPSULE, EXTENDED RELEASE ORAL DAILY
Status: DISCONTINUED | OUTPATIENT
Start: 2023-08-25 | End: 2023-08-28

## 2023-08-25 RX ORDER — SENNOSIDES 8.6 MG
17.2 TABLET ORAL NIGHTLY PRN
Status: DISCONTINUED | OUTPATIENT
Start: 2023-08-25 | End: 2023-08-28

## 2023-08-25 RX ORDER — GABAPENTIN 100 MG/1
100 CAPSULE ORAL 3 TIMES DAILY
Status: DISCONTINUED | OUTPATIENT
Start: 2023-08-25 | End: 2023-08-28

## 2023-08-25 RX ORDER — SODIUM CHLORIDE 9 MG/ML
INJECTION, SOLUTION INTRAVENOUS CONTINUOUS
Status: DISCONTINUED | OUTPATIENT
Start: 2023-08-25 | End: 2023-08-26

## 2023-08-25 RX ORDER — ATORVASTATIN CALCIUM 20 MG/1
20 TABLET, FILM COATED ORAL DAILY
Status: DISCONTINUED | OUTPATIENT
Start: 2023-08-25 | End: 2023-08-28

## 2023-08-25 RX ORDER — MELATONIN
3 NIGHTLY PRN
Status: DISCONTINUED | OUTPATIENT
Start: 2023-08-25 | End: 2023-08-28

## 2023-08-25 RX ORDER — POLYETHYLENE GLYCOL 3350 17 G/17G
17 POWDER, FOR SOLUTION ORAL DAILY PRN
Status: DISCONTINUED | OUTPATIENT
Start: 2023-08-25 | End: 2023-08-28

## 2023-08-25 RX ORDER — METOCLOPRAMIDE HYDROCHLORIDE 5 MG/ML
5 INJECTION INTRAMUSCULAR; INTRAVENOUS EVERY 8 HOURS PRN
Status: DISCONTINUED | OUTPATIENT
Start: 2023-08-25 | End: 2023-08-28

## 2023-08-25 RX ORDER — ACETAMINOPHEN 500 MG
500 TABLET ORAL EVERY 4 HOURS PRN
Status: DISCONTINUED | OUTPATIENT
Start: 2023-08-25 | End: 2023-08-28

## 2023-08-25 RX ORDER — FLUDROCORTISONE ACETATE 0.1 MG/1
0.1 TABLET ORAL DAILY
Status: DISCONTINUED | OUTPATIENT
Start: 2023-08-25 | End: 2023-08-28

## 2023-08-25 NOTE — TELEPHONE ENCOUNTER
Spoke to Marie and advised that referral was completed and authorized , and that we faxed it over today and received confirmation.

## 2023-08-26 LAB
ANION GAP SERPL CALC-SCNC: 6 MMOL/L (ref 0–18)
BASOPHILS # BLD AUTO: 0.07 X10(3) UL (ref 0–0.2)
BASOPHILS NFR BLD AUTO: 0.9 %
BUN BLD-MCNC: 10 MG/DL (ref 7–18)
BUN/CREAT SERPL: 12.2 (ref 10–20)
CALCIUM BLD-MCNC: 8 MG/DL (ref 8.5–10.1)
CHLORIDE SERPL-SCNC: 115 MMOL/L (ref 98–112)
CO2 SERPL-SCNC: 24 MMOL/L (ref 21–32)
CREAT BLD-MCNC: 0.82 MG/DL
DEPRECATED RDW RBC AUTO: 48.3 FL (ref 35.1–46.3)
EGFRCR SERPLBLD CKD-EPI 2021: 71 ML/MIN/1.73M2 (ref 60–?)
EOSINOPHIL # BLD AUTO: 0.2 X10(3) UL (ref 0–0.7)
EOSINOPHIL NFR BLD AUTO: 2.5 %
ERYTHROCYTE [DISTWIDTH] IN BLOOD BY AUTOMATED COUNT: 13.5 % (ref 11–15)
GLUCOSE BLD-MCNC: 91 MG/DL (ref 70–99)
HCT VFR BLD AUTO: 38.8 %
HGB BLD-MCNC: 12.9 G/DL
IMM GRANULOCYTES # BLD AUTO: 0.02 X10(3) UL (ref 0–1)
IMM GRANULOCYTES NFR BLD: 0.3 %
LYMPHOCYTES # BLD AUTO: 3.14 X10(3) UL (ref 1–4)
LYMPHOCYTES NFR BLD AUTO: 39.9 %
MCH RBC QN AUTO: 31.9 PG (ref 26–34)
MCHC RBC AUTO-ENTMCNC: 33.2 G/DL (ref 31–37)
MCV RBC AUTO: 95.8 FL
MONOCYTES # BLD AUTO: 0.91 X10(3) UL (ref 0.1–1)
MONOCYTES NFR BLD AUTO: 11.6 %
NEUTROPHILS # BLD AUTO: 3.52 X10 (3) UL (ref 1.5–7.7)
NEUTROPHILS # BLD AUTO: 3.52 X10(3) UL (ref 1.5–7.7)
NEUTROPHILS NFR BLD AUTO: 44.8 %
OSMOLALITY SERPL CALC.SUM OF ELEC: 299 MOSM/KG (ref 275–295)
PLATELET # BLD AUTO: 227 10(3)UL (ref 150–450)
POTASSIUM SERPL-SCNC: 3.1 MMOL/L (ref 3.5–5.1)
RBC # BLD AUTO: 4.05 X10(6)UL
SODIUM SERPL-SCNC: 145 MMOL/L (ref 136–145)
WBC # BLD AUTO: 7.9 X10(3) UL (ref 4–11)

## 2023-08-26 PROCEDURE — 99232 SBSQ HOSP IP/OBS MODERATE 35: CPT | Performed by: INTERNAL MEDICINE

## 2023-08-26 RX ORDER — POTASSIUM CHLORIDE 20 MEQ/1
40 TABLET, EXTENDED RELEASE ORAL ONCE
Status: COMPLETED | OUTPATIENT
Start: 2023-08-26 | End: 2023-08-26

## 2023-08-27 LAB
ANION GAP SERPL CALC-SCNC: 5 MMOL/L (ref 0–18)
BASOPHILS # BLD AUTO: 0.07 X10(3) UL (ref 0–0.2)
BASOPHILS NFR BLD AUTO: 1 %
BUN BLD-MCNC: 9 MG/DL (ref 7–18)
BUN/CREAT SERPL: 12 (ref 10–20)
CALCIUM BLD-MCNC: 8.1 MG/DL (ref 8.5–10.1)
CHLORIDE SERPL-SCNC: 116 MMOL/L (ref 98–112)
CO2 SERPL-SCNC: 24 MMOL/L (ref 21–32)
CREAT BLD-MCNC: 0.75 MG/DL
DEPRECATED RDW RBC AUTO: 50.1 FL (ref 35.1–46.3)
EGFRCR SERPLBLD CKD-EPI 2021: 79 ML/MIN/1.73M2 (ref 60–?)
EOSINOPHIL # BLD AUTO: 0.24 X10(3) UL (ref 0–0.7)
EOSINOPHIL NFR BLD AUTO: 3.3 %
ERYTHROCYTE [DISTWIDTH] IN BLOOD BY AUTOMATED COUNT: 13.7 % (ref 11–15)
GLUCOSE BLD-MCNC: 91 MG/DL (ref 70–99)
HCT VFR BLD AUTO: 40.9 %
HGB BLD-MCNC: 13.2 G/DL
IMM GRANULOCYTES # BLD AUTO: 0.02 X10(3) UL (ref 0–1)
IMM GRANULOCYTES NFR BLD: 0.3 %
LYMPHOCYTES # BLD AUTO: 3.12 X10(3) UL (ref 1–4)
LYMPHOCYTES NFR BLD AUTO: 43 %
MCH RBC QN AUTO: 31.8 PG (ref 26–34)
MCHC RBC AUTO-ENTMCNC: 32.3 G/DL (ref 31–37)
MCV RBC AUTO: 98.6 FL
MONOCYTES # BLD AUTO: 0.79 X10(3) UL (ref 0.1–1)
MONOCYTES NFR BLD AUTO: 10.9 %
NEUTROPHILS # BLD AUTO: 3.02 X10 (3) UL (ref 1.5–7.7)
NEUTROPHILS # BLD AUTO: 3.02 X10(3) UL (ref 1.5–7.7)
NEUTROPHILS NFR BLD AUTO: 41.5 %
OSMOLALITY SERPL CALC.SUM OF ELEC: 298 MOSM/KG (ref 275–295)
PLATELET # BLD AUTO: 215 10(3)UL (ref 150–450)
POTASSIUM SERPL-SCNC: 3.5 MMOL/L (ref 3.5–5.1)
POTASSIUM SERPL-SCNC: 3.5 MMOL/L (ref 3.5–5.1)
RBC # BLD AUTO: 4.15 X10(6)UL
SODIUM SERPL-SCNC: 145 MMOL/L (ref 136–145)
WBC # BLD AUTO: 7.3 X10(3) UL (ref 4–11)

## 2023-08-27 PROCEDURE — 99232 SBSQ HOSP IP/OBS MODERATE 35: CPT | Performed by: INTERNAL MEDICINE

## 2023-08-27 RX ORDER — QUETIAPINE FUMARATE 25 MG/1
25 TABLET, FILM COATED ORAL NIGHTLY
Status: DISCONTINUED | OUTPATIENT
Start: 2023-08-27 | End: 2023-08-28

## 2023-08-27 RX ORDER — SODIUM CHLORIDE 9 MG/ML
INJECTION, SOLUTION INTRAVENOUS CONTINUOUS
Status: DISCONTINUED | OUTPATIENT
Start: 2023-08-27 | End: 2023-08-28

## 2023-08-28 VITALS
BODY MASS INDEX: 25 KG/M2 | HEART RATE: 83 BPM | WEIGHT: 143.69 LBS | SYSTOLIC BLOOD PRESSURE: 158 MMHG | TEMPERATURE: 99 F | DIASTOLIC BLOOD PRESSURE: 92 MMHG | RESPIRATION RATE: 18 BRPM | OXYGEN SATURATION: 94 %

## 2023-08-28 LAB
ANION GAP SERPL CALC-SCNC: 4 MMOL/L (ref 0–18)
BASOPHILS # BLD AUTO: 0.06 X10(3) UL (ref 0–0.2)
BASOPHILS NFR BLD AUTO: 0.8 %
BUN BLD-MCNC: 12 MG/DL (ref 7–18)
BUN/CREAT SERPL: 15.2 (ref 10–20)
CALCIUM BLD-MCNC: 8.3 MG/DL (ref 8.5–10.1)
CHLORIDE SERPL-SCNC: 115 MMOL/L (ref 98–112)
CO2 SERPL-SCNC: 26 MMOL/L (ref 21–32)
CREAT BLD-MCNC: 0.79 MG/DL
DEPRECATED RDW RBC AUTO: 49.6 FL (ref 35.1–46.3)
EGFRCR SERPLBLD CKD-EPI 2021: 74 ML/MIN/1.73M2 (ref 60–?)
EOSINOPHIL # BLD AUTO: 0.25 X10(3) UL (ref 0–0.7)
EOSINOPHIL NFR BLD AUTO: 3.3 %
ERYTHROCYTE [DISTWIDTH] IN BLOOD BY AUTOMATED COUNT: 13.7 % (ref 11–15)
GLUCOSE BLD-MCNC: 91 MG/DL (ref 70–99)
HCT VFR BLD AUTO: 40.7 %
HGB BLD-MCNC: 13.4 G/DL
IMM GRANULOCYTES # BLD AUTO: 0.03 X10(3) UL (ref 0–1)
IMM GRANULOCYTES NFR BLD: 0.4 %
LYMPHOCYTES # BLD AUTO: 2.63 X10(3) UL (ref 1–4)
LYMPHOCYTES NFR BLD AUTO: 34.4 %
MCH RBC QN AUTO: 32.1 PG (ref 26–34)
MCHC RBC AUTO-ENTMCNC: 32.9 G/DL (ref 31–37)
MCV RBC AUTO: 97.6 FL
MONOCYTES # BLD AUTO: 0.93 X10(3) UL (ref 0.1–1)
MONOCYTES NFR BLD AUTO: 12.2 %
NEUTROPHILS # BLD AUTO: 3.75 X10 (3) UL (ref 1.5–7.7)
NEUTROPHILS # BLD AUTO: 3.75 X10(3) UL (ref 1.5–7.7)
NEUTROPHILS NFR BLD AUTO: 48.9 %
OSMOLALITY SERPL CALC.SUM OF ELEC: 299 MOSM/KG (ref 275–295)
PLATELET # BLD AUTO: 215 10(3)UL (ref 150–450)
POTASSIUM SERPL-SCNC: 3.6 MMOL/L (ref 3.5–5.1)
RBC # BLD AUTO: 4.17 X10(6)UL
SODIUM SERPL-SCNC: 145 MMOL/L (ref 136–145)
WBC # BLD AUTO: 7.7 X10(3) UL (ref 4–11)

## 2023-08-28 PROCEDURE — 99239 HOSP IP/OBS DSCHRG MGMT >30: CPT | Performed by: INTERNAL MEDICINE

## 2023-08-28 RX ORDER — QUETIAPINE FUMARATE 25 MG/1
25 TABLET, FILM COATED ORAL NIGHTLY
Qty: 30 TABLET | Refills: 0 | Status: SHIPPED | OUTPATIENT
Start: 2023-08-28

## 2023-08-29 ENCOUNTER — INITIAL APN SNF VISIT (OUTPATIENT)
Facility: SKILLED NURSING FACILITY | Age: 83
End: 2023-08-29

## 2023-08-29 DIAGNOSIS — R53.1 WEAKNESS GENERALIZED: ICD-10-CM

## 2023-08-29 DIAGNOSIS — E03.9 ACQUIRED HYPOTHYROIDISM: Primary | ICD-10-CM

## 2023-08-29 DIAGNOSIS — S32.010S CLOSED COMPRESSION FRACTURE OF L1 VERTEBRA, SEQUELA: ICD-10-CM

## 2023-08-29 DIAGNOSIS — R62.7 FAILURE TO THRIVE IN ADULT: ICD-10-CM

## 2023-08-29 DIAGNOSIS — I95.1 ORTHOSTATIC HYPOTENSION: ICD-10-CM

## 2023-08-29 DIAGNOSIS — F33.9 DEPRESSION, RECURRENT (HCC): ICD-10-CM

## 2023-08-29 DIAGNOSIS — Z72.0 TOBACCO ABUSE: ICD-10-CM

## 2023-08-29 PROCEDURE — 1123F ACP DISCUSS/DSCN MKR DOCD: CPT | Performed by: NURSE PRACTITIONER

## 2023-08-29 PROCEDURE — 1159F MED LIST DOCD IN RCRD: CPT | Performed by: NURSE PRACTITIONER

## 2023-08-29 PROCEDURE — 1160F RVW MEDS BY RX/DR IN RCRD: CPT | Performed by: NURSE PRACTITIONER

## 2023-08-29 PROCEDURE — 99310 SBSQ NF CARE HIGH MDM 45: CPT | Performed by: NURSE PRACTITIONER

## 2023-08-29 PROCEDURE — 1111F DSCHRG MED/CURRENT MED MERGE: CPT | Performed by: NURSE PRACTITIONER

## 2023-09-07 ENCOUNTER — SNF DISCHARGE (OUTPATIENT)
Facility: SKILLED NURSING FACILITY | Age: 83
End: 2023-09-07

## 2023-09-07 DIAGNOSIS — R53.83 OTHER FATIGUE: ICD-10-CM

## 2023-09-07 DIAGNOSIS — I95.1 ORTHOSTATIC HYPOTENSION: ICD-10-CM

## 2023-09-07 DIAGNOSIS — I71.40 ABDOMINAL AORTIC ANEURYSM (AAA) WITHOUT RUPTURE, UNSPECIFIED PART (HCC): Primary | ICD-10-CM

## 2023-09-07 DIAGNOSIS — R53.1 WEAKNESS GENERALIZED: ICD-10-CM

## 2023-09-07 DIAGNOSIS — F33.9 DEPRESSION, RECURRENT (HCC): ICD-10-CM

## 2023-09-07 DIAGNOSIS — E03.9 ACQUIRED HYPOTHYROIDISM: ICD-10-CM

## 2023-09-07 PROCEDURE — 99310 SBSQ NF CARE HIGH MDM 45: CPT | Performed by: NURSE PRACTITIONER

## 2023-09-07 PROCEDURE — 1159F MED LIST DOCD IN RCRD: CPT | Performed by: NURSE PRACTITIONER

## 2023-09-07 PROCEDURE — 1111F DSCHRG MED/CURRENT MED MERGE: CPT | Performed by: NURSE PRACTITIONER

## 2023-09-07 PROCEDURE — 1160F RVW MEDS BY RX/DR IN RCRD: CPT | Performed by: NURSE PRACTITIONER

## 2023-09-07 NOTE — PROGRESS NOTES
Leticia Akbar, 31/1940, 80year old, female is being discharged from [address] [place] [address] to home      [address]    Date of Admission to [facility]:08/28/23    Date of Discharge from [address] [place] [address]: 09/09/23                      Admitting Diagnoses:  Orthostatic Hypotension, Failure to Thrive, diarrhea, weakness generalized     Reason for Admission to [facility]: Rehabilitation and strengthening      PHYSICAL EXAM:  GENERAL HEALTH:well developed, well nourished, in no apparent distress   LINES, TUBES, DRAINS:  none  SKIN: no rashes, no suspicious lesions, warm, dry  WOUND: see wound assessment,   EYES: PERRLA, EOMI, sclera anicteric, conjunctiva normal; there is no nystagmus, no drainage from eyes  HENT: normocephalic; normal nose, no nasal drainage, mucous membranes pink, moist, pharynx no exudate, no visible cerumen. NECK:supple, FROM; no JVD, no TMG, no carotid bruits   BREAST: deferred exam   RESPIRATORY:clear to percussion and auscultation  CARDIOVASCULAR: S1, S2 normal, RRR; no S3, no S4  ABDOMEN:  normal active BS+, soft, nondistended; no organomegaly, no masses; no bruits; nontender, no guarding, no rebound tenderness. :Deferred  LYMPHATIC:no lymphedema  MUSCULOSKELETAL: no acute synovitis upper or lower extremity   EXTREMITIES/VASCULAR:no cyanosis, clubbing or edema  NEUROLOGIC:intact; no sensorimotor deficit and follows commands  PSYCHIATRIC: alert to self, follow commands    CURRENT MANAGEMENT AT [facility]  Acute diarrhea  Multiple bowel movements - resolved  - C. Difficile - neg  - monitor     Recent mechanical fall  - Recent recurrent mechanical falls, due to orthostatic hypotension, arthralgias  - Continue with midodrine 5mg BID  - continue fludrocortisone 0.1mg daily   - PT/OT  - monitor      Orthostatic hypotension  Likely a cause of her dizziness. Full orthostatic blood pressures unable to be completed.   Patient became symptomatic during orthostatic checks, seems to be positive based off of symptomatology. - 2D echo: unremarkable  - Continue with midodrine 5mg BID  - continue fludrocortisone 0.1mg daily   - PT/OT     Fatigue  - Chronic     Neck pain  Joint Stiffness  Chronic L1 compression fracture  - Cervical x-ray did show spondylosis most prominent on at C4-C5, C5-C6, C6-C7  - continue Gabapentin 100mg TIDPRN  - PT/OT     Coronary artery disease  Hx of Calcium score > 400, follows with Dr. Debi Coy, last seen 2/24/2022  - needs new cardiologist  - monitor      HLD  - continue Lipitor 20mg HS  - monitor      Hypothyroidism  - continue Levothyroxine 88 mcg daily   - monitor      Depression  - continue Effexor 37.5 mg once a day  - continue seroquel 25mg HS  - psych to follow in rehab     Active tobacco use  - Evidence of emphysema on CT calcium score 2/24/2022  - Discussed the need to stop smoking altogether, aware of the risk for lung cancer, COPD  - monitor       Medication Reconciliation Completed:  Yes - All medications and side effects reviewed with patient. Patient has all her medications and home and 2 weeks provided to her     [address]  Future Appointments   Date Time Provider Florencia Canales   10/5/2023 12:00 PM Evita Charles MD [employer]        This is a 35 minute visit and greater than 50% of the time was spent counseling the patient and/or coordinating care.     Carley London, RADHA  9/7/2023

## 2023-09-12 ENCOUNTER — HOSPITAL ENCOUNTER (INPATIENT)
Facility: HOSPITAL | Age: 83
LOS: 3 days | Discharge: NURSING, REHAB OR LONG-TERM CARE | End: 2023-09-15
Attending: EMERGENCY MEDICINE | Admitting: INTERNAL MEDICINE
Payer: MEDICARE

## 2023-09-12 ENCOUNTER — TELEPHONE (OUTPATIENT)
Dept: INTERNAL MEDICINE CLINIC | Facility: CLINIC | Age: 83
End: 2023-09-12

## 2023-09-12 ENCOUNTER — APPOINTMENT (OUTPATIENT)
Dept: GENERAL RADIOLOGY | Facility: HOSPITAL | Age: 83
End: 2023-09-12
Attending: EMERGENCY MEDICINE
Payer: MEDICARE

## 2023-09-12 DIAGNOSIS — N30.00 ACUTE CYSTITIS WITHOUT HEMATURIA: ICD-10-CM

## 2023-09-12 DIAGNOSIS — M79.10 MYALGIA: Primary | ICD-10-CM

## 2023-09-12 PROBLEM — E78.5 HYPERLIPIDEMIA: Status: ACTIVE | Noted: 2023-09-12

## 2023-09-12 PROBLEM — N18.30 CKD (CHRONIC KIDNEY DISEASE) STAGE 3, GFR 30-59 ML/MIN (HCC): Status: ACTIVE | Noted: 2023-09-12

## 2023-09-12 LAB
ALBUMIN SERPL-MCNC: 3.2 G/DL (ref 3.4–5)
ALBUMIN/GLOB SERPL: 0.8 {RATIO} (ref 1–2)
ALP LIVER SERPL-CCNC: 64 U/L
ALT SERPL-CCNC: 28 U/L
ANION GAP SERPL CALC-SCNC: 5 MMOL/L (ref 0–18)
AST SERPL-CCNC: 24 U/L (ref 15–37)
BASOPHILS # BLD AUTO: 0.07 X10(3) UL (ref 0–0.2)
BASOPHILS NFR BLD AUTO: 0.6 %
BILIRUB SERPL-MCNC: 1.1 MG/DL (ref 0.1–2)
BILIRUB UR QL: NEGATIVE
BUN BLD-MCNC: 12 MG/DL (ref 7–18)
BUN/CREAT SERPL: 12.5 (ref 10–20)
CALCIUM BLD-MCNC: 9.2 MG/DL (ref 8.5–10.1)
CHLORIDE SERPL-SCNC: 107 MMOL/L (ref 98–112)
CK SERPL-CCNC: 46 U/L
CO2 SERPL-SCNC: 29 MMOL/L (ref 21–32)
COLOR UR: YELLOW
CREAT BLD-MCNC: 0.96 MG/DL
DEPRECATED RDW RBC AUTO: 49.9 FL (ref 35.1–46.3)
EGFRCR SERPLBLD CKD-EPI 2021: 59 ML/MIN/1.73M2 (ref 60–?)
EOSINOPHIL # BLD AUTO: 0.08 X10(3) UL (ref 0–0.7)
EOSINOPHIL NFR BLD AUTO: 0.7 %
ERYTHROCYTE [DISTWIDTH] IN BLOOD BY AUTOMATED COUNT: 13.7 % (ref 11–15)
GLOBULIN PLAS-MCNC: 4.2 G/DL (ref 2.8–4.4)
GLUCOSE BLD-MCNC: 131 MG/DL (ref 70–99)
GLUCOSE UR-MCNC: NORMAL MG/DL
HCT VFR BLD AUTO: 45.9 %
HGB BLD-MCNC: 15.1 G/DL
IMM GRANULOCYTES # BLD AUTO: 0.03 X10(3) UL (ref 0–1)
IMM GRANULOCYTES NFR BLD: 0.3 %
LEUKOCYTE ESTERASE UR QL STRIP.AUTO: 500
LYMPHOCYTES # BLD AUTO: 2.7 X10(3) UL (ref 1–4)
LYMPHOCYTES NFR BLD AUTO: 24.4 %
MAGNESIUM SERPL-MCNC: 2.1 MG/DL (ref 1.6–2.6)
MCH RBC QN AUTO: 32.3 PG (ref 26–34)
MCHC RBC AUTO-ENTMCNC: 32.9 G/DL (ref 31–37)
MCV RBC AUTO: 98.1 FL
MONOCYTES # BLD AUTO: 1.2 X10(3) UL (ref 0.1–1)
MONOCYTES NFR BLD AUTO: 10.8 %
NEUTROPHILS # BLD AUTO: 6.99 X10 (3) UL (ref 1.5–7.7)
NEUTROPHILS # BLD AUTO: 6.99 X10(3) UL (ref 1.5–7.7)
NEUTROPHILS NFR BLD AUTO: 63.2 %
NITRITE UR QL STRIP.AUTO: NEGATIVE
OSMOLALITY SERPL CALC.SUM OF ELEC: 294 MOSM/KG (ref 275–295)
PH UR: 5.5 [PH] (ref 5–8)
PLATELET # BLD AUTO: 339 10(3)UL (ref 150–450)
POTASSIUM SERPL-SCNC: 3.5 MMOL/L (ref 3.5–5.1)
PROT SERPL-MCNC: 7.4 G/DL (ref 6.4–8.2)
PROT UR-MCNC: 30 MG/DL
RBC # BLD AUTO: 4.68 X10(6)UL
SARS-COV-2 RNA RESP QL NAA+PROBE: NOT DETECTED
SODIUM SERPL-SCNC: 141 MMOL/L (ref 136–145)
SP GR UR STRIP: 1.02 (ref 1–1.03)
T3FREE SERPL-MCNC: 1.72 PG/ML (ref 2.4–4.2)
T4 FREE SERPL-MCNC: 1 NG/DL (ref 0.8–1.7)
TSI SER-ACNC: 1.54 MIU/ML (ref 0.36–3.74)
UROBILINOGEN UR STRIP-ACNC: 8
WBC # BLD AUTO: 11.1 X10(3) UL (ref 4–11)
WBC #/AREA URNS AUTO: >50 /HPF

## 2023-09-12 PROCEDURE — 71045 X-RAY EXAM CHEST 1 VIEW: CPT | Performed by: EMERGENCY MEDICINE

## 2023-09-12 RX ORDER — BISACODYL 10 MG
10 SUPPOSITORY, RECTAL RECTAL
Status: DISCONTINUED | OUTPATIENT
Start: 2023-09-12 | End: 2023-09-15

## 2023-09-12 RX ORDER — FLUDROCORTISONE ACETATE 0.1 MG/1
0.1 TABLET ORAL DAILY
Status: DISCONTINUED | OUTPATIENT
Start: 2023-09-13 | End: 2023-09-15

## 2023-09-12 RX ORDER — LEVOTHYROXINE SODIUM 88 UG/1
88 TABLET ORAL
Status: DISCONTINUED | OUTPATIENT
Start: 2023-09-13 | End: 2023-09-15

## 2023-09-12 RX ORDER — VENLAFAXINE HYDROCHLORIDE 37.5 MG/1
37.5 CAPSULE, EXTENDED RELEASE ORAL DAILY
Status: DISCONTINUED | OUTPATIENT
Start: 2023-09-13 | End: 2023-09-14

## 2023-09-12 RX ORDER — HEPARIN SODIUM 5000 [USP'U]/ML
5000 INJECTION, SOLUTION INTRAVENOUS; SUBCUTANEOUS EVERY 12 HOURS SCHEDULED
Status: DISCONTINUED | OUTPATIENT
Start: 2023-09-12 | End: 2023-09-15

## 2023-09-12 RX ORDER — GABAPENTIN 100 MG/1
100 CAPSULE ORAL 3 TIMES DAILY
Status: DISCONTINUED | OUTPATIENT
Start: 2023-09-12 | End: 2023-09-15

## 2023-09-12 RX ORDER — ENEMA 19; 7 G/133ML; G/133ML
1 ENEMA RECTAL ONCE AS NEEDED
Status: DISCONTINUED | OUTPATIENT
Start: 2023-09-12 | End: 2023-09-15

## 2023-09-12 RX ORDER — ACETAMINOPHEN 500 MG
500 TABLET ORAL EVERY 4 HOURS PRN
Status: DISCONTINUED | OUTPATIENT
Start: 2023-09-12 | End: 2023-09-15

## 2023-09-12 RX ORDER — KETOROLAC TROMETHAMINE 15 MG/ML
15 INJECTION, SOLUTION INTRAMUSCULAR; INTRAVENOUS ONCE
Status: COMPLETED | OUTPATIENT
Start: 2023-09-12 | End: 2023-09-12

## 2023-09-12 RX ORDER — QUETIAPINE FUMARATE 25 MG/1
25 TABLET, FILM COATED ORAL NIGHTLY
Status: DISCONTINUED | OUTPATIENT
Start: 2023-09-12 | End: 2023-09-15

## 2023-09-12 RX ORDER — ONDANSETRON 2 MG/ML
4 INJECTION INTRAMUSCULAR; INTRAVENOUS EVERY 6 HOURS PRN
Status: DISCONTINUED | OUTPATIENT
Start: 2023-09-12 | End: 2023-09-15

## 2023-09-12 RX ORDER — SENNOSIDES 8.6 MG
17.2 TABLET ORAL NIGHTLY PRN
Status: DISCONTINUED | OUTPATIENT
Start: 2023-09-12 | End: 2023-09-15

## 2023-09-12 RX ORDER — SODIUM CHLORIDE 9 MG/ML
INJECTION, SOLUTION INTRAVENOUS CONTINUOUS
Status: DISCONTINUED | OUTPATIENT
Start: 2023-09-12 | End: 2023-09-15

## 2023-09-12 RX ORDER — MIDODRINE HYDROCHLORIDE 5 MG/1
5 TABLET ORAL
Status: DISCONTINUED | OUTPATIENT
Start: 2023-09-13 | End: 2023-09-15

## 2023-09-12 RX ORDER — POLYETHYLENE GLYCOL 3350 17 G/17G
17 POWDER, FOR SOLUTION ORAL DAILY PRN
Status: DISCONTINUED | OUTPATIENT
Start: 2023-09-12 | End: 2023-09-15

## 2023-09-12 RX ORDER — ATORVASTATIN CALCIUM 20 MG/1
20 TABLET, FILM COATED ORAL NIGHTLY
Status: DISCONTINUED | OUTPATIENT
Start: 2023-09-12 | End: 2023-09-15

## 2023-09-12 RX ORDER — MAGNESIUM OXIDE 400 MG (241.3 MG MAGNESIUM) TABLET
3 TABLET NIGHTLY PRN
Status: DISCONTINUED | OUTPATIENT
Start: 2023-09-12 | End: 2023-09-15

## 2023-09-12 NOTE — ED INITIAL ASSESSMENT (HPI)
Arrives from home, [place] granddaughter called the ambulance, I was in so much pain everywhere.   I wouldn't have left the rehab if I felt this bad\"    Denies falls or trauma, states she left rehab at [facility] on 9/9/23 \"because of all the diarrhea\"

## 2023-09-13 LAB
ANION GAP SERPL CALC-SCNC: 7 MMOL/L (ref 0–18)
ATRIAL RATE: 86 BPM
BASOPHILS # BLD AUTO: 0.07 X10(3) UL (ref 0–0.2)
BASOPHILS NFR BLD AUTO: 0.7 %
BUN BLD-MCNC: 14 MG/DL (ref 7–18)
BUN/CREAT SERPL: 15.9 (ref 10–20)
CALCIUM BLD-MCNC: 8.6 MG/DL (ref 8.5–10.1)
CHLORIDE SERPL-SCNC: 111 MMOL/L (ref 98–112)
CO2 SERPL-SCNC: 24 MMOL/L (ref 21–32)
CREAT BLD-MCNC: 0.88 MG/DL
DEPRECATED RDW RBC AUTO: 50.1 FL (ref 35.1–46.3)
EGFRCR SERPLBLD CKD-EPI 2021: 65 ML/MIN/1.73M2 (ref 60–?)
EOSINOPHIL # BLD AUTO: 0.24 X10(3) UL (ref 0–0.7)
EOSINOPHIL NFR BLD AUTO: 2.6 %
ERYTHROCYTE [DISTWIDTH] IN BLOOD BY AUTOMATED COUNT: 13.6 % (ref 11–15)
GLUCOSE BLD-MCNC: 101 MG/DL (ref 70–99)
HCT VFR BLD AUTO: 39.5 %
HGB BLD-MCNC: 12.8 G/DL
IMM GRANULOCYTES # BLD AUTO: 0.03 X10(3) UL (ref 0–1)
IMM GRANULOCYTES NFR BLD: 0.3 %
LYMPHOCYTES # BLD AUTO: 3.75 X10(3) UL (ref 1–4)
LYMPHOCYTES NFR BLD AUTO: 39.9 %
MCH RBC QN AUTO: 31.9 PG (ref 26–34)
MCHC RBC AUTO-ENTMCNC: 32.4 G/DL (ref 31–37)
MCV RBC AUTO: 98.5 FL
MONOCYTES # BLD AUTO: 1.27 X10(3) UL (ref 0.1–1)
MONOCYTES NFR BLD AUTO: 13.5 %
NEUTROPHILS # BLD AUTO: 4.05 X10 (3) UL (ref 1.5–7.7)
NEUTROPHILS # BLD AUTO: 4.05 X10(3) UL (ref 1.5–7.7)
NEUTROPHILS NFR BLD AUTO: 43 %
OSMOLALITY SERPL CALC.SUM OF ELEC: 295 MOSM/KG (ref 275–295)
P AXIS: 70 DEGREES
P-R INTERVAL: 154 MS
PLATELET # BLD AUTO: 265 10(3)UL (ref 150–450)
POTASSIUM SERPL-SCNC: 3.5 MMOL/L (ref 3.5–5.1)
Q-T INTERVAL: 358 MS
QRS DURATION: 84 MS
QTC CALCULATION (BEZET): 428 MS
R AXIS: -24 DEGREES
RBC # BLD AUTO: 4.01 X10(6)UL
SODIUM SERPL-SCNC: 142 MMOL/L (ref 136–145)
T AXIS: 85 DEGREES
VENTRICULAR RATE: 86 BPM
WBC # BLD AUTO: 9.4 X10(3) UL (ref 4–11)

## 2023-09-13 PROCEDURE — 99222 1ST HOSP IP/OBS MODERATE 55: CPT | Performed by: INTERNAL MEDICINE

## 2023-09-13 RX ORDER — LIOTHYRONINE SODIUM 5 UG/1
5 TABLET ORAL DAILY
Status: DISCONTINUED | OUTPATIENT
Start: 2023-09-13 | End: 2023-09-15

## 2023-09-13 NOTE — PHYSICAL THERAPY NOTE
PHYSICAL THERAPY EVALUATION - INPATIENT     Room Number: 471/471-A  Evaluation Date: 9/13/2023  Type of Evaluation: Initial   Physician Order: PT Eval and Treat    Presenting Problem: myalgia  Reason for Therapy: Mobility Dysfunction and Discharge Planning    PHYSICAL THERAPY ASSESSMENT   Orders received and chart reviewed. KIYA Lakhani approved participation in physical therapy. Session coordinated with OT, gait belt donned. PPE worn by therapist: mask and gloves. Patient was not wearing a mask during session. Patient is a 80year old female admitted 9/12/2023 for Presenting Problem: myalgia. Patient presented in bed with pain. Education provided on Physical therapy plan of care and physiological benefits of out of bed mobility. Patient with good carryover. Patient on room air. Patient oxygen sat 93% and heart rate 82, blood pressure sitting 98/66, standing 80/47 and after steps to bedside chair with feet elevated in bedside chair, blood pressure 96/56. Patient currently with SBA to mod A x 1 for mobility with rolling walker during the session. Patient is currently functioning below baseline with bed mobility, transfers, and gait as a result of the following impairments: decreased functional strength, pain, and medical status. Next session anticipate to progress bed mobility, transfers, and gait. Patient history and/or personal factors that may impact the plan of care include limited social support. Based on the physical therapy examination of the noted systems and functional activity/participation limitations, the patient presentation is evolving given the patient demonstrates worsening of previously stable condition and demonstrates worsening of co-morbidities. Patient would benefit from continued skilled physical therapy interventions to maximize patient safety and functional independence.  Updated nurse on results of session, patient left in bedside chair with alarm set, all lines intact, all needs met with call light in reach. Bed mobility: Supervision  Transfers: Min assist  Gait Assistance: Moderate assistance  Distance (ft): steps to bedside chair  Assistive Device: Rolling walker             Patient was left in bedside chair and alarm activated at end of session with all needs in reach. Patient's current functional deficits include bed mobility, transfers, and gait. Patient does not have the physical skills to return to prior living environment upon D/C from the hospital. Anticipate patient will benefit from continued skilled physical therapy while in the hospital and upon D/C from the hospital at a rehab facility. The patient's Approx Degree of Impairment: 57.7% has been calculated based on documentation in the [facility] '6 clicks' Inpatient Basic Mobility Short Form. Research supports that patients with this level of impairment may benefit from Subacute Rehab. RN aware of patient status post session. Patient will benefit from continued IP PT services to address these deficits in preparation for discharge. DISCHARGE RECOMMENDATIONS  PT Discharge Recommendations: Sub-acute rehabilitation    PLAN  PT Treatment Plan: Bed mobility; Body mechanics; Patient education;Gait training;Transfer training;Balance training;Strengthening  Rehab Potential : Good  Frequency (Obs): 5x/week       PHYSICAL THERAPY MEDICAL/SOCIAL HISTORY   Problem List  Principal Problem:    Myalgia  Active Problems:    Acquired hypothyroidism    Tobacco abuse    CAD (coronary artery disease)    Depression, recurrent (HCC)    Orthostatic hypotension    Weakness generalized    Failure to thrive in adult    Acute cystitis without hematuria    CKD (chronic kidney disease) stage 3, GFR 30-59 ml/min (HCC)    Hyperlipidemia    Past Medical History  Past Medical History:   Diagnosis Date    Abdominal aortic aneurysm (AAA) without rupture ([facility] [address] 8/4/2021    Acquired hypothyroidism 10/26/2012    Agatston CAC score, >400 8/4/2021    Arthritis Atherosclerosis of aorta ([facility] [address] 5/19/2016    Chronic bronchitis ([facility] [address] 8/4/2021    Closed compression fracture of L1 vertebra ([facility] [address] 8/4/2021    Dep[facility] 8/4/2021    Hypothyroid     Orthostatic hypertension 04/2021     Past Surgical History  Past Surgical History:   Procedure Laterality Date    APPENDECTOMY      HIP REPLACEMENT SURGERY Right     HYSTERECTOMY      OTHER SURGICAL HISTORY  4/16/16     SBO     HOME SITUATION  Type of Home: House   Home Layout: One level              Lives With: Alone (granddaughter checks in)  Drives: No  Patient Owned Equipment: Rolling walker  Patient Regularly Uses: Glasses; Reading glasses    Prior Level of [place]: Patient reports being independent in ADL's and ambulation with no assistive device prior to admission to the hospital.     SUBJECTIVE  \"I feel so tired\"    PHYSICAL THERAPY EXAMINATION     OBJECTIVE  Precautions: None  Fall Risk: Standard fall risk    WEIGHT BEARING RESTRICTION  Weight Bearing Restriction: None                PAIN ASSESSMENT  Rating: Unable to rate  Location: right side neck  Management Techniques: Activity promotion; Body mechanics;Repositioning    COGNITION  Overall Cognitive Status:  WFL - within functional limits    RANGE OF MOTION AND STRENGTH ASSESSMENT    Lower extremity ROM is within functional limits  BLE WNL    Lower extremity strength is within functional limits  BLE WNL    BALANCE  Static Sitting: Good  Dynamic Sitting: Fair +  Static Standing: Fair -  Dynamic Standing: Poor +    AM-PAC '6-Clicks' INPATIENT SHORT FORM - BASIC MOBILITY  How much difficulty does the patient currently have. ..   Patient Difficulty: Turning over in bed (including adjusting bedclothes, sheets and blankets)?: A Little   Patient Difficulty: Sitting down on and standing up from a chair with arms (e.g., wheelchair, bedside commode, etc.): A Little   Patient Difficulty: Moving from lying on back to sitting on the side of the bed?: A Little   How much help from another person does the patient currently need. .. Help from Another: Moving to and from a bed to a chair (including a wheelchair)?: A Lot   Help from Another: Need to walk in hospital room?: A Lot   Help from Another: Climbing 3-5 steps with a railing?: A Lot     AM-PAC Score:  Raw Score: 15   Approx Degree of Impairment: 57.7%   Standardized Score (AM-PAC Scale): 39.45   CMS Modifier (G-Code): CK    Exercise/Education Provided:  Bed mobility  Body mechanics  Transfer training    Patient End of Session: Up in chair;Needs met;Call light within reach;RN aware of session/findings; All patient questions and concerns addressed; Alarm set    CURRENT GOALS  Goals to be met by: 9/30/23  Patient Goal Patient's self-stated goal is: to go home   Goal #1 Patient is able to demonstrate supine - sit EOB @ level: supervision     Goal #1   Current Status    Goal #2 Patient is able to demonstrate transfers Sit to/from Stand at assistance level: supervision with walker - rolling     Goal #2  Current Status    Goal #3 Patient is able to ambulate 100 feet with assist device: walker - rolling at assistance level: supervision   Goal #3   Current Status    Goal #4    Goal #4   Current Status    Goal #5 Patient to demonstrate independence with home activity/exercise instructions provided to patient in preparation for discharge.    Goal #5   Current Status    Goal #6    Goal #6  Current Status     Patient Evaluation Complexity Level:  History Low - no personal factors and/or co-morbidities   Examination of body systems Low - addressing 1-2 elements   Clinical Presentation Moderate - Evolving   Clinical Decision Making Low Complexity     Therapeutic Activity: 13 minutes  Therapeutic Exercise: 10 minutes

## 2023-09-13 NOTE — CM/SW NOTE
Department  notified of request for daria PATEL referrals started. Assigned CM/SW to follow up with pt/family on further discharge planning.        [facility]au Hylton [facility]  9/13

## 2023-09-13 NOTE — ED QUICK NOTES
Orders for admission, patient is aware of plan and ready to go upstairs. Any questions, please call ED RN Odalis at extension [phone].      Patient Covid vaccination status: Fully vaccinated     COVID Test Ordered in ED: SARS-CoV-2 by PCR (GENEXPERT)    COVID Suspicion at Admission: N/A    Running Infusions:  None    Mental Status/LOC at time of transport: AO x3 (intermittent confusion)    Other pertinent information:   CIWA score: N/A   NIH score:  N/A

## 2023-09-13 NOTE — CM/SW NOTE
Department  notified of request for [facility], aidin referrals started. Assigned CM/SW to follow up with pt/family on further discharge planning.        9/13

## 2023-09-13 NOTE — CM/SW NOTE
09/13/23 1000   CM/SW Screening   Referral [id number] staff; Chart review;Nursing rounds   Patient's Current Mental Status at Time of Assessment Alert;Oriented   Patient's [address]   Number of Levels in Home 1   Number of Stair in Home 0   Patient lives with Alone   Patient Status Prior to Admission   Independent with ADLs and Mobility No   Pt. requires assistance with Housework;Driving;Meals; Bathing; Ambulating;Dressing;Medications; Finances   Services in place prior to admission [address] [facility];DME/Supplies at home   [address] [place] [address]   Type of DME/Supplies Virgle Chars; Wheelchair; Shower Chair     CM met with pt who confirmed address and PCP is Dr Dre Yepez. Pt reports she lives at home alone. Recently dc from [address] on 9/9 with [facility]. They were unable to start care prior to readmission. Pt sts her HCPOA is her grandson Jestine Baumgarten, requested CM call him for more dc planning. CM called Brenda Popegarten who confirmed above. Cm, req copy of document for the chart. He has resources for in home caregivers and is working to secure 24 hr supv for pt. He sts if he is unable to secure long term cg then she will eventually need LTC placement. CM advised Brenda Thorntonumgarten that LTC is going to be oop until spend down of financial assets is completed and pt qualifies for medicaid. Mark paredes. CM asked if there is a financial POA, none is in place at this time. CM rec moving fwd asap with FPOA. Jestine Baumgarten is agreeable to proceed with AMANDA as dc plan, he would like options other than BTL if possible. CM req [facility] send AMANDA ref with possible bridge to LTC as well as [facility] with rajat order and upd. PASRR done, is under review. PT/OT recs will be needed to assist with dc planning. If AMANDA then pt will need ins auth and facility choice. 1430  PT rec is [facility]AN[facility]  List left at bedside and vm left for POA re above.   Will need choice and ins auth to proceed. AMANDA list emailed to lillian/ANAT at [email or web address]    9/14 1000  Choice is [facility]. Liaison notified to start [address] [place] [address]    9/15 [address] approved for AMANDA.  notified RN. Await confirmation of medical clearance to proceed. [facility] notified lillian, he requested medicar and is agreeable to fees. [facility] 3:30pm  [address] [place] [address]  RN report [phone]    / to remain available for support and/or discharge planning.      Miladys Blakely RN    Ext [phone]

## 2023-09-14 ENCOUNTER — APPOINTMENT (OUTPATIENT)
Dept: CT IMAGING | Facility: HOSPITAL | Age: 83
End: 2023-09-14
Attending: INTERNAL MEDICINE
Payer: MEDICARE

## 2023-09-14 PROBLEM — F33.1 MAJOR DEPRESSIVE DISORDER, RECURRENT EPISODE, MODERATE (HCC): Status: ACTIVE | Noted: 2023-09-14

## 2023-09-14 LAB
ANION GAP SERPL CALC-SCNC: 3 MMOL/L (ref 0–18)
BASOPHILS # BLD AUTO: 0.07 X10(3) UL (ref 0–0.2)
BASOPHILS NFR BLD AUTO: 0.8 %
BUN BLD-MCNC: 20 MG/DL (ref 7–18)
BUN/CREAT SERPL: 22.2 (ref 10–20)
CALCIUM BLD-MCNC: 8.5 MG/DL (ref 8.5–10.1)
CHLORIDE SERPL-SCNC: 113 MMOL/L (ref 98–112)
CO2 SERPL-SCNC: 25 MMOL/L (ref 21–32)
CREAT BLD-MCNC: 0.9 MG/DL
DEPRECATED RDW RBC AUTO: 50.6 FL (ref 35.1–46.3)
EGFRCR SERPLBLD CKD-EPI 2021: 63 ML/MIN/1.73M2 (ref 60–?)
EOSINOPHIL # BLD AUTO: 0.32 X10(3) UL (ref 0–0.7)
EOSINOPHIL NFR BLD AUTO: 3.6 %
ERYTHROCYTE [DISTWIDTH] IN BLOOD BY AUTOMATED COUNT: 13.5 % (ref 11–15)
GLUCOSE BLD-MCNC: 92 MG/DL (ref 70–99)
HCT VFR BLD AUTO: 39.7 %
HGB BLD-MCNC: 12.7 G/DL
IMM GRANULOCYTES # BLD AUTO: 0.04 X10(3) UL (ref 0–1)
IMM GRANULOCYTES NFR BLD: 0.4 %
LYMPHOCYTES # BLD AUTO: 3.19 X10(3) UL (ref 1–4)
LYMPHOCYTES NFR BLD AUTO: 35.5 %
MCH RBC QN AUTO: 32.1 PG (ref 26–34)
MCHC RBC AUTO-ENTMCNC: 32 G/DL (ref 31–37)
MCV RBC AUTO: 100.3 FL
MONOCYTES # BLD AUTO: 1.11 X10(3) UL (ref 0.1–1)
MONOCYTES NFR BLD AUTO: 12.3 %
NEUTROPHILS # BLD AUTO: 4.26 X10 (3) UL (ref 1.5–7.7)
NEUTROPHILS # BLD AUTO: 4.26 X10(3) UL (ref 1.5–7.7)
NEUTROPHILS NFR BLD AUTO: 47.4 %
OSMOLALITY SERPL CALC.SUM OF ELEC: 294 MOSM/KG (ref 275–295)
PLATELET # BLD AUTO: 304 10(3)UL (ref 150–450)
POTASSIUM SERPL-SCNC: 4.5 MMOL/L (ref 3.5–5.1)
POTASSIUM SERPL-SCNC: 4.5 MMOL/L (ref 3.5–5.1)
RBC # BLD AUTO: 3.96 X10(6)UL
SODIUM SERPL-SCNC: 141 MMOL/L (ref 136–145)
WBC # BLD AUTO: 9 X10(3) UL (ref 4–11)

## 2023-09-14 PROCEDURE — 90792 PSYCH DIAG EVAL W/MED SRVCS: CPT | Performed by: NURSE PRACTITIONER

## 2023-09-14 PROCEDURE — 72125 CT NECK SPINE W/O DYE: CPT | Performed by: INTERNAL MEDICINE

## 2023-09-14 RX ORDER — VANCOMYCIN HYDROCHLORIDE 125 MG/1
125 CAPSULE ORAL DAILY
Status: DISCONTINUED | OUTPATIENT
Start: 2023-09-14 | End: 2023-09-14

## 2023-09-14 RX ORDER — AMOXICILLIN 500 MG/1
500 CAPSULE ORAL 3 TIMES DAILY
Qty: 15 CAPSULE | Refills: 0 | Status: DISCONTINUED | OUTPATIENT
Start: 2023-09-14 | End: 2023-09-15

## 2023-09-14 RX ORDER — VANCOMYCIN HYDROCHLORIDE 125 MG/1
125 CAPSULE ORAL DAILY
Status: DISCONTINUED | OUTPATIENT
Start: 2023-09-14 | End: 2023-09-15

## 2023-09-14 RX ORDER — VENLAFAXINE HYDROCHLORIDE 75 MG/1
75 CAPSULE, EXTENDED RELEASE ORAL DAILY
Status: DISCONTINUED | OUTPATIENT
Start: 2023-09-15 | End: 2023-09-15

## 2023-09-14 NOTE — DISCHARGE INSTRUCTIONS
You were seen in the hospital for weakness, pain, and failure to thrive. You were found to have a urinary tract infection and were treated with antibiotics. Due to ongoing weakness and deconditioning, the PT and OT team recommended further rehab at a subacute rehab facility to further improve your general strengthen and mobility. You will need to complete a course of antibiotics:   - Amoxicillin until Monday 9/18  - Vancomycin until 9/21    At this time, it is recommended that you follow up with a psychiatrist for continued medication management. A few referrals are listed below. Please schedule an initial appointment following discharge from [facility].     Dr. Gloria Cast MD  [facility]  [address] [facility], [address] [place] [address]  [phone]    Flor Chisholm [facility] , Physician Assistant (Psychiatry)  [address] [facility]   [address] [facility] [address] [facility], [address] [place] [address]  [phone]    Bridger Cesar for Clinical Psychiatric Neuroscience  [facility] [address], [address]  [facility], [address] [place] [address]  [phone]

## 2023-09-14 NOTE — PHYSICAL THERAPY NOTE
PHYSICAL THERAPY TREATMENT NOTE - INPATIENT     Room Number: 471/471-A       Presenting Problem: myalgia    Problem List  Principal Problem:    Myalgia  Active Problems:    Acquired hypothyroidism    Tobacco abuse    CAD (coronary artery disease)    Depression, recurrent (HCC)    Orthostatic hypotension    Weakness generalized    Failure to thrive in adult    Acute cystitis without hematuria    CKD (chronic kidney disease) stage 3, GFR 30-59 ml/min (HCC)    Hyperlipidemia      PHYSICAL THERAPY ASSESSMENT   Chart reviewed. KIYA Khan approved participation in physical therapy. Session coordinated with OT. PPE worn by therapist: mask and gloves. Patient was not wearing a mask during session. Patient presented in  bathroom  with neck pain. Patient with good  progress towards goals during this session. Education provided on Physical therapy plan of care and physiological benefits of out of bed mobility. Patient with good carryover. Patient agreeable to ambulation to bedside chair, reports feeling tired and having pain in right side neck. Patient currently with min A x 1 for mobility during the session with rolling walker. Patient with posterior loss of balance when initially standing from toilet. Patient assisted to bedside chair with min A x 1. Patient on room air, oxygen sat 94% and heart rate 80, blood pressure 125/81. Patient continues to function below baseline with bed mobility, transfers, and gait. Patient remains most limited by decreased functional strength, pain, and medical status. Will continue to follow patient for duration of hospital stay per plan of care, next session anticipate to progress bed mobility, transfers, and gait. Discharge recommendation remains appropriate based on the patient's performance, personal factors, and remaining functional impairments. Goals remain in progress.  Updated nurse on results of session, patient left in bedside chair with alarm set, all lines intact, all needs met with call light in reach. Bed mobility:  not tested  Transfers: Min assist  Gait Assistance: Minimum assistance  Distance (ft): 25ft  Assistive Device: Rolling walker  Pattern: Shuffle          Patient was left in bedside chair and alarm activated at end of session with all needs in reach. Patient does not have the physical skills to return to prior living environment upon D/C from the hospital. Anticipate patient will benefit from continued skilled physical therapy while in the hospital and upon D/C from the hospital at a rehab facility. The patient's Approx Degree of Impairment: 50.57% has been calculated based on documentation in the [facility] [place] '6 clicks' Inpatient Basic Mobility Short Form. Research supports that patients with this level of impairment may benefit from Subacute Rehab. RN aware of patient status post session. DISCHARGE RECOMMENDATIONS  PT Discharge Recommendations: Sub-acute rehabilitation     PLAN  PT Treatment Plan: Bed mobility; Body mechanics; Patient education;Gait training;Transfer training;Balance training;Strengthening    SUBJECTIVE  \"I have neck pain\"    OBJECTIVE  Precautions: Bed/chair alarm    WEIGHT BEARING RESTRICTION  Weight Bearing Restriction: None                PAIN ASSESSMENT   Rating: Unable to rate  Location: right side neck  Management Techniques: Activity promotion; Body mechanics;Repositioning    BALANCE                                                                                                                       Static Sitting: Good  Dynamic Sitting: Fair +           Static Standing: Fair  Dynamic [facility] '6-Clicks' INPATIENT SHORT FORM - BASIC MOBILITY  How much difficulty does the patient currently have. ..   Patient Difficulty: Turning over in bed (including adjusting bedclothes, sheets and blankets)?: A Little   Patient Difficulty: Sitting down on and standing up from a chair with arms (e.g., wheelchair, bedside commode, etc.): A Little   Patient Difficulty: Moving from lying on back to sitting on the side of the bed?: A Little   How much help from another person does the patient currently need. .. Help from Another: Moving to and from a bed to a chair (including a wheelchair)?: A Little   Help from Another: Need to walk in hospital room?: A Little   Help from Another: Climbing 3-5 steps with a railing?: A Lot     AM-PAC Score:  Raw Score: 17   Approx Degree of Impairment: 50.57%   Standardized Score (AM-PAC Scale): 42.13   CMS Modifier (G-Code): CK    Patient End of Session: Up in chair;Needs met;Call light within reach;RN aware of session/findings; All patient questions and concerns addressed; Alarm set    CURRENT GOALS   Goals to be met by: 9/30/23  Patient Goal Patient's self-stated goal is: to go home   Goal #1 Patient is able to demonstrate supine - sit EOB @ level: supervision      Goal #1   Current Status Not tested    Goal #2 Patient is able to demonstrate transfers Sit to/from Stand at assistance level: supervision with walker - rolling      Goal #2  Current Status Min A x 1 with rolling walker    Goal #3 Patient is able to ambulate 100 feet with assist device: walker - rolling at assistance level: supervision   Goal #3   Current Status 25ft with rolling walker min A x 1    Goal #4     Goal #4   Current Status     Goal #5 Patient to demonstrate independence with home activity/exercise instructions provided to patient in preparation for discharge.    Goal #5   Current Status In progress   Goal #6     Goal #6  Current Status        Gait Training: 10 minutes  Therapeutic Activity: 13 minutes

## 2023-09-15 VITALS
DIASTOLIC BLOOD PRESSURE: 81 MMHG | BODY MASS INDEX: 25 KG/M2 | HEART RATE: 79 BPM | OXYGEN SATURATION: 95 % | TEMPERATURE: 98 F | WEIGHT: 143.31 LBS | SYSTOLIC BLOOD PRESSURE: 160 MMHG | RESPIRATION RATE: 18 BRPM

## 2023-09-15 LAB
ANION GAP SERPL CALC-SCNC: 6 MMOL/L (ref 0–18)
BASOPHILS # BLD AUTO: 0.06 X10(3) UL (ref 0–0.2)
BASOPHILS NFR BLD AUTO: 0.8 %
BUN BLD-MCNC: 15 MG/DL (ref 7–18)
BUN/CREAT SERPL: 17.9 (ref 10–20)
CALCIUM BLD-MCNC: 8.5 MG/DL (ref 8.5–10.1)
CHLORIDE SERPL-SCNC: 116 MMOL/L (ref 98–112)
CO2 SERPL-SCNC: 23 MMOL/L (ref 21–32)
CREAT BLD-MCNC: 0.84 MG/DL
DEPRECATED RDW RBC AUTO: 48.9 FL (ref 35.1–46.3)
EGFRCR SERPLBLD CKD-EPI 2021: 69 ML/MIN/1.73M2 (ref 60–?)
EOSINOPHIL # BLD AUTO: 0.28 X10(3) UL (ref 0–0.7)
EOSINOPHIL NFR BLD AUTO: 3.7 %
ERYTHROCYTE [DISTWIDTH] IN BLOOD BY AUTOMATED COUNT: 13.4 % (ref 11–15)
GLUCOSE BLD-MCNC: 86 MG/DL (ref 70–99)
HCT VFR BLD AUTO: 37.5 %
HGB BLD-MCNC: 12 G/DL
IMM GRANULOCYTES # BLD AUTO: 0.03 X10(3) UL (ref 0–1)
IMM GRANULOCYTES NFR BLD: 0.4 %
LYMPHOCYTES # BLD AUTO: 2.75 X10(3) UL (ref 1–4)
LYMPHOCYTES NFR BLD AUTO: 36.3 %
MCH RBC QN AUTO: 31.6 PG (ref 26–34)
MCHC RBC AUTO-ENTMCNC: 32 G/DL (ref 31–37)
MCV RBC AUTO: 98.7 FL
MONOCYTES # BLD AUTO: 0.92 X10(3) UL (ref 0.1–1)
MONOCYTES NFR BLD AUTO: 12.1 %
NEUTROPHILS # BLD AUTO: 3.54 X10 (3) UL (ref 1.5–7.7)
NEUTROPHILS # BLD AUTO: 3.54 X10(3) UL (ref 1.5–7.7)
NEUTROPHILS NFR BLD AUTO: 46.7 %
OSMOLALITY SERPL CALC.SUM OF ELEC: 300 MOSM/KG (ref 275–295)
PLATELET # BLD AUTO: 280 10(3)UL (ref 150–450)
POTASSIUM SERPL-SCNC: 3.9 MMOL/L (ref 3.5–5.1)
RBC # BLD AUTO: 3.8 X10(6)UL
SODIUM SERPL-SCNC: 145 MMOL/L (ref 136–145)
WBC # BLD AUTO: 7.6 X10(3) UL (ref 4–11)

## 2023-09-15 PROCEDURE — 99239 HOSP IP/OBS DSCHRG MGMT >30: CPT | Performed by: INTERNAL MEDICINE

## 2023-09-15 RX ORDER — AMOXICILLIN 500 MG/1
500 CAPSULE ORAL 3 TIMES DAILY
Qty: 15 CAPSULE | Refills: 0 | Status: SHIPPED | OUTPATIENT
Start: 2023-09-15

## 2023-09-15 RX ORDER — VANCOMYCIN HYDROCHLORIDE 125 MG/1
125 CAPSULE ORAL DAILY
Status: SHIPPED | COMMUNITY
Start: 2023-09-16

## 2023-09-15 RX ORDER — LIOTHYRONINE SODIUM 5 UG/1
5 TABLET ORAL DAILY
Status: SHIPPED | COMMUNITY
Start: 2023-09-16

## 2023-09-15 NOTE — DISCHARGE SUMMARY
[facility]    Discharge Summary    Aury Smith Patient Status:  Inpatient    1940 MRN [id number]   Location [facility] 4W/SW/SE Attending Kellen Sparks MD   Hosp Day # 3 PCP Peggy Pan MD     Date of Admission: 2023 Disposition: SNF Subacute Rehab     Date of Discharge: 9/15/2023    Admitting Diagnosis: Myalgia [M79.10]  Acute cystitis without hematuria [N30.00]    Discharge Diagnosis: Patient Active Problem List:     Acquired hypothyroidism     Tobacco abuse     CAD (coronary artery disease)     Atherosclerosis of aorta (HCC)     Depression, recurrent ([address]     Agatston CAC score, >400     Abdominal aortic aneurysm (AAA) without rupture (HCC)     Closed compression fracture of L1 vertebra (HCC)     Diarrhea, unspecified type     Orthostatic hypotension     Weakness generalized     Failure to thrive in adult     Myalgia     Acute cystitis without hematuria     CKD (chronic kidney disease) stage 3, GFR 30-59 ml/min ([facility])     Hyperlipidemia     Major depressive disorder, recurrent episode, moderate ([address]    Acute care history and physical and progress notes for further details. Patient with history of hypothyroidism, orthostatic hypotension, presumed CAD with history of positive cardiac calcium scan, chronic kidney disease stage III, depression, chronic low back pain secondary to lumbar compression fracture, tobacco abuse initially presented to ER with complaints of generalized weakness, myalgias/arthralgias, decreased energy and generalized malaise. Family was noting symptoms of failure to thrive. Patient was noted to have UTI with culture that eventually grew Aeromonas urinae. Initially had been placed on Rocephin and then transition to p.o. amoxicillin. 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   Patient seen in consultation by psych liaison who increased Effexor dose and continue with same quetiapine. Lidoderm patch was placed on neck and patient noted significant improvement in discomfort. Her mental status significantly improved with patient becoming much more engaged in conversation, noted less pain, started eating better. However she continued to remain weak and therefore it was felt patient would benefit from rehab to increase strength and endurance. Patient was accepted at [facility] rehab and was transferred there. Medications per medicine reconciliation form    Discharge Condition: Stable         Discharge Medications:      Discharge Medications        START taking these medications        Instructions Prescription details   amoxicillin 500 MG Caps  Commonly known as: Amoxil      Take 1 capsule (500 mg total) by mouth in the morning, at noon, and at bedtime. Quantity: 15 capsule  Refills: 0     lidocaine-menthol 4-1 % Ptch  Start taking on: September 16, 2023      Place 1 patch onto the skin daily. Quantity: 30 patch  Refills: 0     liothyronine 5 MCG Tabs  Commonly known as: Cytomel  Start taking on: September 16, 2023      Take 1 tablet (5 mcg total) by mouth daily. Refills: 0     vancomycin 125 MG Caps  Commonly known as: Vancocin  Start taking on: September 16, 2023      Take 1 capsule (125 mg total) by mouth daily. Refills: 0            CONTINUE taking these medications        Instructions Prescription details   atorvastatin 20 MG Tabs  Commonly known as: Lipitor      Take 1 tablet (20 mg total) by mouth daily. Quantity: 90 tablet  Refills: 3     fludrocortisone 0.1 MG Tabs  Commonly known as: Florinef      Take 1 tablet (0.1 mg total) by mouth daily.    Quantity: 90 tablet  Refills: 3     gabapentin 100 MG Caps  Commonly known as: Neurontin      TAKE 1 CAPSULE BY MOUTH THREE TIMES DAILY AS NEEDED FOR PAIN   Quantity: 100 capsule  Refills: 3     levothyroxine 88 MCG Tabs  Commonly known as: Synthroid      Take 1 tablet (88 mcg total) by mouth before breakfast. Quantity: 90 tablet  Refills: 3     midodrine 5 MG Tabs  Commonly known as: ProAmatine      Take 1 tablet (5 mg total) by mouth 2 (two) times daily before meals. Quantity: 180 tablet  Refills: 3     QUEtiapine 25 MG Tabs  Commonly known as: SEROquel      Take 1 tablet (25 mg total) by mouth nightly. Quantity: 30 tablet  Refills: 0     venlafaxine ER 37.5 MG Cp24  Commonly known as: Effexor-XR      Take 1 capsule (37.5 mg total) by mouth daily. Quantity: 90 capsule  Refills: 3               Where to Get Your Medications        These medications were sent to [address] [place] [address] [phone], [facility], [address] [facility] [address]      Phone: [phone]   amoxicillin 500 MG Caps  lidocaine-menthol 4-1 % Ptch         Follow up Visits: Follow-up with pcp in 7 days after discharge from rehab    Total face to face time was 35 mins, more than 50% of the time was spent in counseling and/or coordination of care related to management of patient's medical conditions, discharge arrangements.       Johan Figueroa MD  9/15/2023  12:46 PM

## 2023-09-15 NOTE — PHYSICAL THERAPY NOTE
PHYSICAL THERAPY TREATMENT NOTE - INPATIENT     Room Number: 471/471-A       Presenting Problem: myalgia    Problem List  Principal Problem:    Myalgia  Active Problems:    Acquired hypothyroidism    Tobacco abuse    CAD (coronary artery disease)    Depression, recurrent (HCC)    Orthostatic hypotension    Weakness generalized    Failure to thrive in adult    Acute cystitis without hematuria    CKD (chronic kidney disease) stage 3, GFR 30-59 ml/min (HCC)    Hyperlipidemia    Major depressive disorder, recurrent episode, moderate (HCC)      PHYSICAL THERAPY ASSESSMENT   Chart reviewed. RN Devin Duarte approved participation in physical therapy. Session coordinated with OT, gait belt donned. PPE worn by therapist: mask and gloves. Patient was not wearing a mask during session. Patient presented in bed with pain. Patient with good  progress towards goals during this session. Education provided on Physical therapy plan of care and physiological benefits of out of bed mobility. Patient with good carryover. Patient on room air. Patient agreeable to ambulation. Patient able to ambulate about 80ft with rolling walker with CGA. Patient with improved ambulation this session. Patient assisted to bedside chair with CGA. Bed mobility: Contact guard assist  Transfers: Contact guard assist  Gait Assistance: Contact guard assist  Distance (ft): 80ft  Assistive Device: Rolling walker  Pattern: Shuffle          Patient was left in bedside chair and alarm activated at end of session with all needs in reach. Patient does not currently have the physical skills to return to prior living environment upon D/C  from the hospital. Anticipate patient will benefit from continued skilled physical therapy while in the hospital and upon D/C from the hospital at a rehab facility. The patient's Approx Degree of Impairment: 50.57% has been calculated based on documentation in the [facility] '6 clicks' Inpatient Basic Mobility Short Form.   Research supports that patients with this level of impairment may benefit from Subacute Rehab. RN aware of patient status post session. DISCHARGE RECOMMENDATIONS  PT Discharge Recommendations: Sub-acute rehabilitation     PLAN  PT Treatment Plan: Bed mobility; Body mechanics; Patient education;Gait training;Stair training;Transfer training;Balance training;Strengthening    SUBJECTIVE  \"I have to sit in the chair?!\"    OBJECTIVE  Precautions: Bed/chair alarm    WEIGHT BEARING RESTRICTION  Weight Bearing Restriction: None                PAIN ASSESSMENT   Rating: Unable to rate  Location: right side neck has lidocaine patch  Management Techniques: Activity promotion; Body mechanics;Repositioning    BALANCE                                                                                                                       Static Sitting: Good  Dynamic Sitting: Fair +           Static Standing: Fair  Dynamic [place] '6-Clicks' INPATIENT SHORT FORM - BASIC MOBILITY  How much difficulty does the patient currently have. .. Patient Difficulty: Turning over in bed (including adjusting bedclothes, sheets and blankets)?: A Little   Patient Difficulty: Sitting down on and standing up from a chair with arms (e.g., wheelchair, bedside commode, etc.): A Little   Patient Difficulty: Moving from lying on back to sitting on the side of the bed?: A Little   How much help from another person does the patient currently need. .. Help from Another: Moving to and from a bed to a chair (including a wheelchair)?: A Little   Help from Another: Need to walk in hospital room?: A Little   Help from Another: Climbing 3-5 steps with a railing?: A Lot     AM-PAC Score:  Raw Score: 17   Approx Degree of Impairment: 50.57%   Standardized Score (AM-PAC Scale): 42.13   CMS Modifier (G-Code): CK    Patient End of Session: Up in chair;Needs met;Call light within reach;RN aware of session/findings; All patient questions and concerns addressed; Alarm set    CURRENT GOALS   Goals to be met by: 9/30/23  Patient Goal Patient's self-stated goal is: to go home   Goal #1 Patient is able to demonstrate supine - sit EOB @ level: supervision      Goal #1   Current Status Goal met   Goal #2 Patient is able to demonstrate transfers Sit to/from Stand at assistance level: supervision with walker - rolling      Goal #2  Current Status Goal met   Goal #3 Patient is able to ambulate 100 feet with assist device: walker - rolling at assistance level: supervision   Goal #3   Current Status 80ft with rolling walker CGA to SBA   Goal #4     Goal #4   Current Status     Goal #5 Patient to demonstrate independence with home activity/exercise instructions provided to patient in preparation for discharge.    Goal #5   Current Status In progress   Goal #6     Goal #6  Current Status        Gait Training: 10 minutes  Therapeutic Activity: 13 minutes

## 2023-09-18 ENCOUNTER — PATIENT OUTREACH (OUTPATIENT)
Dept: CASE MANAGEMENT | Age: 83
End: 2023-09-18

## 2023-09-26 ENCOUNTER — TELEPHONE (OUTPATIENT)
Dept: INTERNAL MEDICINE CLINIC | Facility: CLINIC | Age: 83
End: 2023-09-26

## 2023-09-26 NOTE — TELEPHONE ENCOUNTER
Jeet Hester from [facility] called. Asking if Dr. Bruna Montes will be signing the [facility] orders?

## 2023-09-26 NOTE — TELEPHONE ENCOUNTER
Called Mil Wolfe from [facility] and gave verbal approval for plan of care per protocol

## 2023-10-16 NOTE — PATIENT INSTRUCTIONS
- You were seen in clinic for regular annual check-up. We have ordered labs for you and we will call you with the results. Please obtain the bloodwork fasting for 12 hours. OK to drink water the day of your blood draw. We have the lab downstairs on the first floor. No appointment is necessary. Lab hours are Monday-Friday 7:30 AM to 4:45 PM.  There may be Saturday hours 7 AM-11:00 AM as well. Otherwise you can obtain the blood tests on the weekends at the main hospital or local immediate care/urgent care within the [facility].     -We did review your most recent hospitalization and are happy that you have recovered well since being discharged from rehab. The main goal is improving quality of life, maintaining your safety by reducing risk for falls. We to ensure that you have enough assistance at home. We should consider in the future further caregiver assistance, possibly assisted living.  - The neck pain does show advanced osteoarthritis changes. We should continue with home stretches and exercises of the neck, and working with physical therapy. If needed, consider evaluation with physiatry. - We should also continue focusing on your depression symptoms. We can consider working with a therapist.  [place] continue with venlafaxine for now  -Please continue checking your blood pressures at home and notify us if they are increasing  -Please complete your DEXA scan for evaluation of osteoporosis  -Vaccines you may be due for: COVID dose #5, We recommended checking with your insurance for coverage of Shingrix, a 2-dose shingles vaccine that can be obtained at the pharmacy if there is adequate coverage through your insurance plan. - Please continue to eat a varied diet including recommended servings of vegetables, fruits, and low fat dairy.  Minimize high saturated fats (such as fast foods) and high sugar intake (such as soda)  - We recommend 150 minutes of moderate intensity exercise (brisk walking, swimming) weekly to maintain your current weight. Targeted weight loss will require more vigorous exercise or more than 150 minutes/week.      Return to clinic in 3-4 months for follow-up

## 2023-10-24 ENCOUNTER — TELEPHONE (OUTPATIENT)
Dept: INTERNAL MEDICINE CLINIC | Facility: CLINIC | Age: 83
End: 2023-10-24

## 2023-10-24 NOTE — TELEPHONE ENCOUNTER
Orders signed by MD and faxed to [facility] at # [phone]. Confirmation received. Sent to scanning.

## 2023-10-26 ENCOUNTER — VIRTUAL PHONE E/M (OUTPATIENT)
Dept: INTERNAL MEDICINE CLINIC | Facility: CLINIC | Age: 83
End: 2023-10-26

## 2023-10-26 DIAGNOSIS — F33.9 DEPRESSION, RECURRENT (HCC): ICD-10-CM

## 2023-10-26 DIAGNOSIS — I95.1 ORTHOSTATIC HYPOTENSION: ICD-10-CM

## 2023-10-26 DIAGNOSIS — Z00.00 ANNUAL PHYSICAL EXAM: Primary | ICD-10-CM

## 2023-10-26 DIAGNOSIS — E78.2 MIXED HYPERLIPIDEMIA: ICD-10-CM

## 2023-10-26 DIAGNOSIS — N18.31 STAGE 3A CHRONIC KIDNEY DISEASE (HCC): ICD-10-CM

## 2023-10-26 DIAGNOSIS — R29.6 RECURRENT FALLS: ICD-10-CM

## 2023-10-26 DIAGNOSIS — R53.83 OTHER FATIGUE: ICD-10-CM

## 2023-10-26 DIAGNOSIS — E03.9 ACQUIRED HYPOTHYROIDISM: ICD-10-CM

## 2023-10-26 DIAGNOSIS — Z72.0 TOBACCO ABUSE: ICD-10-CM

## 2023-10-26 PROCEDURE — 99213 OFFICE O/P EST LOW 20 MIN: CPT | Performed by: INTERNAL MEDICINE

## 2023-10-26 NOTE — PROGRESS NOTES
Virtual Telephone Check-In    Patience Speck verbally consents to a Virtual/Telephone Check-In visit on 10/26/23. Patient has been referred to the [facility] website at [email or web address] to review the yearly Consent to Treat document. Patient understands and accepts financial responsibility for any deductible, co-insurance and/or co-pays associated with this service. Duration of the service: 20 minutes    Ms. Dinh Mathews is an 80 F PMHx  history of hypothyroidism, orthostatic hypotension, presumed CAD, CKD 3, depression, chronic low back pain secondary to lumbar compression fracture, tobacco abuse who presents today via phone visit for follow-up. Recall the patient was recently discharged from the hospital 9/15 for acute cystitis, orthostatic hypotension with physical deconditioning. She responded to IV antibiotics and was deemed suitable for further rehab at [facility]acute [facility]     Here with [place] at [facility], still adjusting. She is being more social. There is higher distances that fatigues and limits her usual ADLs. Lico Yepez is still with pain of the neck and hips. It is getting better. She is eating and drinking water. On examination, could have trach test muscle strength and was 5 out of 5, symmetrical bilaterally. She was able to move all extremities. Does appear somewhat fatigued, but in no immediate distress. Summary of topics discussed:       Diagnoses and all orders for this visit:    Annual physical exam    Orthostatic hypotension    Depression, recurrent ([address]    Acquired hypothyroidism    Mixed hyperlipidemia    Recurrent falls    Stage 3a chronic kidney disease ([address]    Other fatigue    Tobacco abuse           Orthostatic hypotension  Likely a cause of her dizziness. Full orthostatic blood pressures unable to be completed. Patient became symptomatic during orthostatic checks, seems to be positive based off of symptomatology.   - Cardiac telemetry  - 2D echo: unremarkable  - Ongoing conservative measures at home  - C/w home fludrocortisone and Midodrine increased to 5 mg twice a day; should watch bps closely     Mobility limitations and ADLs affected:  Corina Cornelius is not able to perform her ADLs such as cleaning, maneuvering her traditional wheelchair in her unit as well as the facility (for activities). She is using a traditional wheelchair. Without assistance, she has difficulty with her orthostatic hypotension     DIstance required to complete ADLs at home:  Can only tolerate 50 feet due to orthostatic hypotension. Her bathroom in the unit is 50 feet. The dining room and activity floor are on separate floors. She does navigate hallways through the facility and need to go through long hallways and use an elevator.      -5 out of 5 upper and lower muscle strength  - Full active range of motion of upper and lower extremities. However difficulty with prolonged standing and ambulation given orthostatic hypotension  - Pain levels only with chronic neck pain. No extremity pain     Patient's mobility limitation cannot be solved with a cane or walker given her orthostatic hypotension.  - Maximum distance is 50 feet prior to the orthostatic symptoms     Manual wheelchair cannot meet typical needs as she has easy fatigability and prolonged distance, she was unable to do this with reasonable quality of ADLs     Scooter is not practical for her as we may have difficulties with transferring in and out of the scooter comfortably. There is also concern for risk for falls given changes in position needed to transfer along with scooter. She has a physical and mental ability that will be required to operate the power wheelchair safely     Her wheelchair will improve the ability to complete her usual activities of daily living     She will be using the power wheelchair more than 2 hours/day.      She should be able to self transfer onto the power wheelchair     Standard armrest should be sufficient for her. Fatigue  Seems to be a chronic issue. Likely multifactorial in the setting of aging, urinary incontinence leading to nocturia up to 4 times a night, poor p.o. intake. - Chronic ongoing issue, will monitor with further resolution of UTI  - Liothyronine added for hypothyroidism     Urinary incontinence  Has been requiring the use of pads, most prominent at nighttime.  - Stop oxybutinin as not helping at this time     Neck pain  Related to a fall due to dizziness about 1 month ago. Still some soreness in the occipital area, intermittent. Has improved however  -Cervical x-ray did show spondylosis most prominent on at C4-C5, C5-C6, C6-C7  - Check CT neck today  - Trial of alternating Tylenol, ibuprofen. Remained stable on current regimen. Otherwise remained stable     Joint stiffness  Localized to both hands, ongoing for quite some time. Suspect osteoarthritis as no evidence of tenosynovitis on my examination  - Check x-ray of the bilateral hands: Prior x-rays 5/2022 did demonstrate osteoarthritis  - BATSHEVA was 1: 80, though overall low suspicion for rheumatological disease  - Can perform home exercises in addition to continuing with Tylenol on an as-needed basis for symptom support     CKD stage IIIa  Stable, creatinine 0.88/eGFR 65 -> 0.84/eGFR 69  - We will monitor      Coronary artery disease  Hx of Calcium score > 400, follows with Dr. Obed Ray, last seen 2/24/2022  -We will need to establish with a new cardiologist given Dr. Alanis Valera recent [facility]  - Seems to be stable     Hypothyroidism  - Repeat TFTs to monitor stability at next follow-up visit  - Levothyroxine 88 mcg  - Liothyronine added 5 mcg     Chronic L1 compression fracture  - On home gabapentin 100 mg TID PRN  - Stable on current regimen     Depression  -Effexor 37.5 mg once a day.        Active tobacco use  - Evidence of emphysema on CT calcium score 2/24/2022  -Discussed the need to stop smoking altogether, aware of the risk for lung cancer, COPD  -Recommended gradual weaning off of cigarettes by decreasing 1-2 cigarettes every 1-2 weeks.  -We also discussed nicotine replacement therapy  - Currently smoking about 7 cigarettes/day    Telehealth outside of [facility]   I conducted a telehealth visit with Dioni diane, 10/26/23, which was completed using two-way, real-time interactive audio and video communication. This has been done in good dayana to provide continuity of care in the best interest of the provider-patient relationship, due to the COVID -19 public health crisis/national emergency where restrictions of face-to-face office visits are ongoing. Every conscious effort was taken to allow for sufficient and adequate time to complete the visit. The patient was made aware of the limitations of the telehealth visit, including treatment limitations as no physical exam could be performed. The patient was advised to call 911 or to go to the ER in case there was an emergency. The patient was also advised of the potential privacy & security concerns related to the telehealth platform. The patient was made aware of where to find [facility] notice of privacy practices, telehealth consent form and other related consent forms and documents. which are located on the [facility] website. The patient verbally agreed to telehealth consent form, related consents and the risks discussed. Lastly, the patient confirmed that they were in [place]. Included in this visit, time may have been spent reviewing labs, medications, radiology tests and decision making. Appropriate medical decision-making and tests are ordered as detailed in the plan of care above. Coding/billing information is submitted for this visit based on complexity of care and/or time spent for the visit.         Jose Antonio Martinez MD

## 2023-10-27 ENCOUNTER — TELEPHONE (OUTPATIENT)
Dept: INTERNAL MEDICINE CLINIC | Facility: CLINIC | Age: 83
End: 2023-10-27

## 2023-10-27 NOTE — TELEPHONE ENCOUNTER
Faxed successfully today Request from [phone] for Power wheelchair signed by Jonathon Rodriguez MD with Virtual Phone Appointment progress notes from 10/26/23.

## 2023-10-31 ENCOUNTER — TELEPHONE (OUTPATIENT)
Dept: INTERNAL MEDICINE CLINIC | Facility: CLINIC | Age: 83
End: 2023-10-31

## 2023-10-31 NOTE — TELEPHONE ENCOUNTER
[facility] Certification and Plan of Care to be signed    Placed in Dr Li mail slot

## 2023-11-21 ENCOUNTER — TELEPHONE (OUTPATIENT)
Dept: INTERNAL MEDICINE CLINIC | Facility: CLINIC | Age: 83
End: 2023-11-21

## 2023-11-21 NOTE — TELEPHONE ENCOUNTER
[facility] faxed over a request for Rehabilitation Therapy Orders.    Fax placed in Dr Li mailbox

## 2023-12-27 ENCOUNTER — HOSPITAL ENCOUNTER (EMERGENCY)
Facility: HOSPITAL | Age: 83
Discharge: HOME | End: 2023-12-28
Attending: STUDENT IN AN ORGANIZED HEALTH CARE EDUCATION/TRAINING PROGRAM
Payer: MEDICARE

## 2023-12-27 DIAGNOSIS — S00.83XA CONTUSION OF FACE, INITIAL ENCOUNTER: Primary | ICD-10-CM

## 2023-12-27 LAB — GLUCOSE BLDC GLUCOMTR-MCNC: 98 MG/DL (ref 70–99)

## 2023-12-27 PROCEDURE — 82962 GLUCOSE BLOOD TEST: CPT

## 2023-12-27 PROCEDURE — 99285 EMERGENCY DEPT VISIT HI MDM: CPT

## 2023-12-28 ENCOUNTER — APPOINTMENT (OUTPATIENT)
Dept: CT IMAGING | Facility: HOSPITAL | Age: 83
End: 2023-12-28
Attending: STUDENT IN AN ORGANIZED HEALTH CARE EDUCATION/TRAINING PROGRAM
Payer: MEDICARE

## 2023-12-28 VITALS
RESPIRATION RATE: 20 BRPM | HEART RATE: 76 BPM | SYSTOLIC BLOOD PRESSURE: 123 MMHG | TEMPERATURE: 98 F | DIASTOLIC BLOOD PRESSURE: 62 MMHG | OXYGEN SATURATION: 97 %

## 2023-12-28 PROCEDURE — 72125 CT NECK SPINE W/O DYE: CPT | Performed by: STUDENT IN AN ORGANIZED HEALTH CARE EDUCATION/TRAINING PROGRAM

## 2023-12-28 PROCEDURE — 70486 CT MAXILLOFACIAL W/O DYE: CPT | Performed by: STUDENT IN AN ORGANIZED HEALTH CARE EDUCATION/TRAINING PROGRAM

## 2023-12-28 PROCEDURE — 70450 CT HEAD/BRAIN W/O DYE: CPT | Performed by: STUDENT IN AN ORGANIZED HEALTH CARE EDUCATION/TRAINING PROGRAM

## 2023-12-28 NOTE — ED QUICK NOTES
Pt was able to ambulate to the bathroom with a safe and steady gait with the assistance of a walker. Pt provided water and warm blankets. Pt updated on poc. Awaiting transport.

## 2023-12-28 NOTE — ED INITIAL ASSESSMENT (HPI)
Pt to ED via [facility] w/ c/o unwitnessed fall. Per EMS, pt was found on the floor by CNA after 1-2 hours. No blood thinners. C-collar in place. Pt denies LOC.

## 2023-12-29 ENCOUNTER — TELEPHONE (OUTPATIENT)
Dept: INTERNAL MEDICINE CLINIC | Facility: CLINIC | Age: 83
End: 2023-12-29

## 2023-12-29 DIAGNOSIS — F03.90 SENILE DEMENTIA (HCC): ICD-10-CM

## 2023-12-29 DIAGNOSIS — R29.6 FREQUENT FALLS: Primary | ICD-10-CM

## 2023-12-29 NOTE — TELEPHONE ENCOUNTER
Patient has had several falls. Patient was in ER on  Thursday. Patient had CT of head. Patient's grandson Kai Baldwin called and   would like more testing ordered.  Patient is also having diarrhea    [facility]  is now [place] [id number] uses    #[phone] fax    Kai Mccainil [phone]

## 2023-12-29 NOTE — TELEPHONE ENCOUNTER
Discussed with lillian Rowe, I agree with home health services.   Please fax over my order as requested

## 2023-12-29 NOTE — TELEPHONE ENCOUNTER
Noted- orders faxed to Jeanmarie at [phone] as requested. Confirmation received. Verified correct fax number with [employer]. Spoke to Yenny to confirm orders were being sent to correct recipient.

## 2023-12-29 NOTE — TELEPHONE ENCOUNTER
Please advise - called lillian per HIPAA, was seen in ER 12/27/23 for falls . Patient is weak and living in independent living at [facility] -lillian states she is calling often , Ever Mahmood is driving \"  keelyy - lillian wants [facility] for PT and nursing [facility] at [place]  fax number [phone]- to DR. BLANCA

## 2024-01-13 ENCOUNTER — HOSPITAL ENCOUNTER (EMERGENCY)
Facility: HOSPITAL | Age: 84
Discharge: HOME | End: 2024-01-13
Attending: EMERGENCY MEDICINE
Payer: MEDICARE

## 2024-01-13 VITALS
OXYGEN SATURATION: 93 % | HEART RATE: 79 BPM | TEMPERATURE: 99 F | SYSTOLIC BLOOD PRESSURE: 96 MMHG | DIASTOLIC BLOOD PRESSURE: 62 MMHG | RESPIRATION RATE: 18 BRPM

## 2024-01-13 DIAGNOSIS — S16.1XXA STRAIN OF NECK MUSCLE, INITIAL ENCOUNTER: Primary | ICD-10-CM

## 2024-01-13 PROCEDURE — 99284 EMERGENCY DEPT VISIT MOD MDM: CPT

## 2024-01-13 PROCEDURE — 99283 EMERGENCY DEPT VISIT LOW MDM: CPT

## 2024-01-13 RX ORDER — TRAMADOL HYDROCHLORIDE 50 MG/1
50 TABLET ORAL EVERY 6 HOURS PRN
Qty: 16 TABLET | Refills: 0 | Status: SHIPPED | OUTPATIENT
Start: 2024-01-13 | End: 2024-01-20

## 2024-01-13 RX ORDER — HYDROCODONE BITARTRATE AND ACETAMINOPHEN 5; 325 MG/1; MG/1
1 TABLET ORAL ONCE
Status: COMPLETED | OUTPATIENT
Start: 2024-01-13 | End: 2024-01-13

## 2024-01-13 RX ORDER — IBUPROFEN 600 MG/1
600 TABLET ORAL ONCE
Status: COMPLETED | OUTPATIENT
Start: 2024-01-13 | End: 2024-01-13

## 2024-01-13 NOTE — ED INITIAL ASSESSMENT (HPI)
Patient aox3 to ed via private ambulance co of neck pain x December with explosive stool although patient does not say it is diarrhea. Denies dizziness. Pain 9/10

## 2024-01-13 NOTE — ED PROVIDER NOTES
Patient Seen in: [facility] Emergency Department    History     Chief Complaint   Patient presents with    Neck Pain    Diarrhea       HPI    83-year-old female with past medical history significant for AAA, hypothyroidism, CAD, depression, presents to the emergency department for evaluation of left neck pain since she had had a fall in December.  She states that on the left side of her neck towards the base of her skull, she has severe pain that is gotten worse since she had fallen 2 to 3 weeks ago.  She states pain is worse with any turning of her head.  She denies any numbness, tingling, weakness.  She states that she is also had explosive stool for several months but denies actually having diarrhea.  Patient denies any abdominal pain.    History reviewed.   Past Medical History:   Diagnosis Date    Abdominal aortic aneurysm (AAA) without rupture ([facility]) 8/4/2021    Acquired hypothyroidism 10/26/2012    Agatston CAC score, >400 8/4/2021    Arthritis     Atherosclerosis of aorta ([facility]) 5/19/2016    Chronic bronchitis ([facility]) 8/4/2021    Closed compression fracture of L1 vertebra ([facility]) 8/4/2021    Depression, recurrent ([facility]) 8/4/2021    Hypothyroid     Orthostatic hypertension 04/2021       History reviewed.   Past Surgical History:   Procedure Laterality Date    APPENDECTOMY      HIP REPLACEMENT SURGERY Right     HYSTERECTOMY      OTHER SURGICAL HISTORY  4/16/16     SBO         Medications :  (Not in a hospital admission)       Family History   Problem Relation Age of Onset    Stroke Mother     Schizophrenia Daughter     Other (hodkins lymphoma) Son        Smoking Status:   Social History     Socioeconomic History    Marital status:    Tobacco Use    Smoking status: Every Day     Packs/day: .25     Types: Cigarettes    Smokeless tobacco: Never    Tobacco comments:     4-5; 60 years   Vaping Use    Vaping Use: Never used   Substance and Sexual Activity    Alcohol use: No    Drug use: No    Sexual activity:  Never       Constitutional and vital signs reviewed.      Social History and Family History elements reviewed from today, pertinent positives to the presenting problem noted.    Physical Exam     ED Triage Vitals [01/13/24 1004]   /82   Pulse 89   Resp 18   Temp 98.9 °F (37.2 °C)   Temp src Temporal   SpO2 95 %   O2 Device None (Room air)       Physical Exam   Constitutional: AAOx3, well nourished, NAD  HEENT: Normocephalic, PERRLA, MMM  CV: s1s2+, RRR, no m/r/g, normal distal pulses  Pulmonary/Chest: CTA b/l with no rales, wheezes.  No chest wall tenderness  Abdominal: Nontender.  Nondistended. Soft. Bowel sounds are normal.   Neck/Back: Tenderness to palpation in the superior left neck.  Full range of motion of the neck.  No midline tenderness percussion  :   Musculoskeletal: Normal range of motion. No deformity.   Neurological: Awake, alert. Normal reflexes. No cranial nerve deficit.    Skin: Skin is warm and dry. No rash noted. No erythema.   Psychiatric:      All measures to prevent infection transmission during my interaction with the patient were taken. The patient was already wearing a droplet mask on my arrival to the room. Personal protective equipment was worn throughout the duration of the exam.      ED Course      Labs Reviewed - No data to display  My Independent Interpretation of EKG (if performed):     Monitor Interpretation:   normal sinus rhythm as interpreted by me.      Imaging Results Available and Reviewed while in ED: No results found.  ED Medications Administered:   Medications   HYDROcodone-acetaminophen (Norco) 5-325 MG per tab 1 tablet (1 tablet Oral Given 1/13/24 1033)   ibuprofen (Motrin) tab 600 mg (600 mg Oral Given 1/13/24 1033)             MDM     Vitals:    01/13/24 1004   BP: 119/82   Pulse: 89   Resp: 18   Temp: 98.9 °F (37.2 °C)   TempSrc: Temporal   SpO2: 95%     *I personally reviewed and interpreted all ED vitals.    Pulse Ox:  , Room air, Normal     Independent  Interpretation of Studies:     History from Independent Source:       External Records Reviewed: Reviewed prior hospital records.  Patient had negative head CT on 27 December.  Patient has been evaluated for diarrhea previously in August and tested negative for C. difficile at that time.  Believed to be functional.    Social Determinants of Health:     Procedures:      Differential/MDM/Shared Decision Making: Differential diagnosis includes tension headache, cervical strain, fracture, intracranial hemorrhage, others.      The patient already has recent fall in December, AAA, hypothyroidism, CAD, depression to contribute to the complexity of this ED evaluation.    I believe patient has cervical strain causing discomfort in her neck.  Patient was given Heidrick in the ED with some improvement.  Discussed case with patient that it is going to be several weeks before her symptoms improved.  Will recommend she follows up with her primary care for further management and physical therapy.      Condition upon leaving the department: Stable    Disposition and Plan     Clinical Impression:  1. Strain of neck muscle, initial encounter        Disposition:  Discharge    Follow-up:  Telly Li MD  [address] [place] [id number]  [phone]    Call in 2 day(s)        Medications Prescribed:  Current Discharge Medication List        START taking these medications    Details   traMADol 50 MG Oral Tab Take 1 tablet (50 mg total) by mouth every 6 (six) hours as needed for Pain.  Qty: 16 tablet, Refills: 0    Associated Diagnoses: Strain of neck muscle, initial encounter

## 2024-01-24 ENCOUNTER — TELEPHONE (OUTPATIENT)
Dept: INTERNAL MEDICINE CLINIC | Facility: CLINIC | Age: 84
End: 2024-01-24

## 2024-01-24 NOTE — TELEPHONE ENCOUNTER
Hortencia / Zach  is calling to ask if Dr Li will sign and follow for home health    [facility] doctor ordered a Nurse, PT & OT    Phone [phone]    She is aware Dr Li is out okay to wait

## 2024-03-27 RX ORDER — HYDROCODONE BITARTRATE AND ACETAMINOPHEN 5; 325 MG/1; MG/1
1 TABLET ORAL EVERY 6 HOURS PRN
Qty: 0.1 TABLET | Refills: 4 | OUTPATIENT
Start: 2024-03-27

## 2024-03-27 NOTE — TELEPHONE ENCOUNTER
Not prescribed by our office.     Current refill request refused due to refill is either a duplicate request or has active refills at the pharmacy.  Check previous templates.    Requested Prescriptions     Refused Prescriptions Disp Refills    HYDROCODONE-ACETAMINOPHEN 5-325 MG Oral Tab [Pharmacy Med Name: HYDROcodone-Acetaminophen 5-325 MG Tablet] 0.1 tablet 4     Sig: ONE TABLET BY MOUTH EVERY 6 HOURS AS NEEDED FOR PAIN *DO NOT EXCEED 4GM/APAP IN 24 HOURS FROM ALL SOURCES*     Refused By: NEHEMIAH HUNTER     Reason for Refusal: Inappropriate, get more info

## 2024-04-03 RX ORDER — MIDODRINE HYDROCHLORIDE 5 MG/1
TABLET ORAL
Qty: 21 TABLET | Refills: 11 | OUTPATIENT
Start: 2024-04-03

## 2024-04-05 RX ORDER — HYDROCODONE BITARTRATE AND ACETAMINOPHEN 5; 325 MG/1; MG/1
1 TABLET ORAL EVERY 6 HOURS PRN
Qty: 0.1 TABLET | Refills: 4 | OUTPATIENT
Start: 2024-04-05

## 2024-04-05 NOTE — TELEPHONE ENCOUNTER
Never RXed by [facility], see 3/27/24 refill encounter. Mychart to pt as well.       Refill request has failed the Ambulatory Medication Refill Standing Order and is routed to the primary physician to review the following:    Requested Prescriptions     Refused Prescriptions Disp Refills    HYDROCODONE-ACETAMINOPHEN 5-325 MG Oral Tab [Pharmacy Med Name: HYDROcodone-Acetaminophen 5-325 MG Tablet] 0.1 tablet 4     Sig: ONE TABLET BY MOUTH EVERY 6 HOURS AS NEEDED FOR PAIN *DO NOT EXCEED 4GM/APAP IN 24 HOURS FROM ALL SOURCES*     Refused By: OTILIO MAY     Reason for Refusal: Prescriber not at this practice

## 2024-04-09 RX ORDER — LEVOTHYROXINE SODIUM 0.07 MG/1
TABLET ORAL
Qty: 30 TABLET | Refills: 11 | OUTPATIENT
Start: 2024-04-09

## 2024-04-09 NOTE — TELEPHONE ENCOUNTER
Pt has active refill at [facility]    Current refill request refused due to refill is either a duplicate request or has active refills at the pharmacy.  Check previous templates.    Requested Prescriptions     Pending Prescriptions Disp Refills    LEVOTHYROXINE 75 MCG Oral Tab [Pharmacy Med Name: Levothyroxine Sodium 75 MCG Tablet] 30 tablet 11     Sig: ONE TABLET BY MOUTH ONCE DAILY ALONG WITH LIOTHYRONINE (DX: HYPOTHYROIDISM)

## 2024-05-30 ENCOUNTER — HOSPITAL ENCOUNTER (INPATIENT)
Facility: HOSPITAL | Age: 84
LOS: 3 days | Discharge: NURSING, REHAB OR LONG-TERM CARE | End: 2024-06-03
Attending: EMERGENCY MEDICINE | Admitting: INTERNAL MEDICINE
Payer: MEDICARE

## 2024-05-30 DIAGNOSIS — R41.82 ALTERED MENTAL STATUS, UNSPECIFIED ALTERED MENTAL STATUS TYPE: ICD-10-CM

## 2024-05-30 DIAGNOSIS — N30.00 ACUTE CYSTITIS WITHOUT HEMATURIA: Primary | ICD-10-CM

## 2024-05-31 ENCOUNTER — APPOINTMENT (OUTPATIENT)
Dept: CT IMAGING | Facility: HOSPITAL | Age: 84
End: 2024-05-31
Attending: EMERGENCY MEDICINE
Payer: MEDICARE

## 2024-05-31 ENCOUNTER — APPOINTMENT (OUTPATIENT)
Dept: GENERAL RADIOLOGY | Facility: HOSPITAL | Age: 84
End: 2024-05-31
Attending: INTERNAL MEDICINE
Payer: MEDICARE

## 2024-05-31 PROBLEM — N17.9 AKI (ACUTE KIDNEY INJURY) (HCC): Status: ACTIVE | Noted: 2024-05-31

## 2024-05-31 PROBLEM — M25.472 LEFT ANKLE SWELLING: Status: ACTIVE | Noted: 2024-05-31

## 2024-05-31 PROBLEM — R41.82 ALTERED MENTAL STATUS, UNSPECIFIED ALTERED MENTAL STATUS TYPE: Status: ACTIVE | Noted: 2024-05-31

## 2024-05-31 LAB
ALBUMIN SERPL-MCNC: 4.1 G/DL (ref 3.2–4.8)
ALBUMIN/GLOB SERPL: 1.4 {RATIO} (ref 1–2)
ALP LIVER SERPL-CCNC: 79 U/L
ALT SERPL-CCNC: 14 U/L
ANION GAP SERPL CALC-SCNC: 7 MMOL/L (ref 0–18)
AST SERPL-CCNC: 22 U/L (ref ?–34)
BASE EXCESS BLD CALC-SCNC: 2.4 MMOL/L (ref ?–2)
BASOPHILS # BLD AUTO: 0.06 X10(3) UL (ref 0–0.2)
BASOPHILS NFR BLD AUTO: 0.4 %
BILIRUB SERPL-MCNC: 1 MG/DL (ref 0.2–1.1)
BILIRUB UR QL: NEGATIVE
BUN BLD-MCNC: 23 MG/DL (ref 9–23)
BUN/CREAT SERPL: 20.5 (ref 10–20)
CALCIUM BLD-MCNC: 9.8 MG/DL (ref 8.7–10.4)
CHLORIDE SERPL-SCNC: 107 MMOL/L (ref 98–112)
CK SERPL-CCNC: 48 U/L
CO2 SERPL-SCNC: 27 MMOL/L (ref 21–32)
COLOR UR: YELLOW
CREAT BLD-MCNC: 1.12 MG/DL
DEPRECATED RDW RBC AUTO: 47.8 FL (ref 35.1–46.3)
EGFRCR SERPLBLD CKD-EPI 2021: 48 ML/MIN/1.73M2 (ref 60–?)
EOSINOPHIL # BLD AUTO: 0.19 X10(3) UL (ref 0–0.7)
EOSINOPHIL NFR BLD AUTO: 1.2 %
ERYTHROCYTE [DISTWIDTH] IN BLOOD BY AUTOMATED COUNT: 13.2 % (ref 11–15)
ETHANOL SERPL-MCNC: <3 MG/DL (ref ?–3)
GLOBULIN PLAS-MCNC: 2.9 G/DL (ref 2–3.5)
GLUCOSE BLD-MCNC: 101 MG/DL (ref 70–99)
GLUCOSE UR-MCNC: NORMAL MG/DL
HCO3 BLDV-SCNC: 25.9 MEQ/L (ref 22–26)
HCT VFR BLD AUTO: 42.2 %
HGB BLD-MCNC: 13.3 G/DL
HGB UR QL STRIP.AUTO: NEGATIVE
HYALINE CASTS #/AREA URNS AUTO: PRESENT /LPF
IMM GRANULOCYTES # BLD AUTO: 0.05 X10(3) UL (ref 0–1)
IMM GRANULOCYTES NFR BLD: 0.3 %
KETONES UR-MCNC: NEGATIVE MG/DL
LACTATE SERPL-SCNC: 1.4 MMOL/L (ref 0.5–2)
LEUKOCYTE ESTERASE UR QL STRIP.AUTO: 500
LYMPHOCYTES # BLD AUTO: 2.99 X10(3) UL (ref 1–4)
LYMPHOCYTES NFR BLD AUTO: 19.4 %
MCH RBC QN AUTO: 30.9 PG (ref 26–34)
MCHC RBC AUTO-ENTMCNC: 31.5 G/DL (ref 31–37)
MCV RBC AUTO: 97.9 FL
MONOCYTES # BLD AUTO: 1.39 X10(3) UL (ref 0.1–1)
MONOCYTES NFR BLD AUTO: 9 %
NEUTROPHILS # BLD AUTO: 10.72 X10 (3) UL (ref 1.5–7.7)
NEUTROPHILS # BLD AUTO: 10.72 X10(3) UL (ref 1.5–7.7)
NEUTROPHILS NFR BLD AUTO: 69.7 %
NITRITE UR QL STRIP.AUTO: NEGATIVE
OSMOLALITY SERPL CALC.SUM OF ELEC: 296 MOSM/KG (ref 275–295)
PCO2 BLDV: 51 MM HG (ref 38–50)
PH BLDV: 7.36 [PH] (ref 7.32–7.43)
PH UR: 5 [PH] (ref 5–8)
PLATELET # BLD AUTO: 286 10(3)UL (ref 150–450)
PO2 BLDV: 34 MM HG (ref 35–40)
POTASSIUM SERPL-SCNC: 4.5 MMOL/L (ref 3.5–5.1)
PROT SERPL-MCNC: 7 G/DL (ref 5.7–8.2)
PUNCTURE CHARGE: NO
RBC # BLD AUTO: 4.31 X10(6)UL
SAO2 % BLDV: 61.4 % (ref 60–85)
SODIUM SERPL-SCNC: 141 MMOL/L (ref 136–145)
SP GR UR STRIP: 1.02 (ref 1–1.03)
T3FREE SERPL-MCNC: 2.68 PG/ML (ref 2.4–4.2)
T4 FREE SERPL-MCNC: 0.9 NG/DL (ref 0.8–1.7)
TSI SER-ACNC: 0.38 MIU/ML (ref 0.55–4.78)
TSI SER-ACNC: 0.39 MIU/ML (ref 0.55–4.78)
UROBILINOGEN UR STRIP-ACNC: NORMAL
WBC # BLD AUTO: 15.4 X10(3) UL (ref 4–11)

## 2024-05-31 PROCEDURE — 70450 CT HEAD/BRAIN W/O DYE: CPT | Performed by: EMERGENCY MEDICINE

## 2024-05-31 PROCEDURE — 73610 X-RAY EXAM OF ANKLE: CPT | Performed by: INTERNAL MEDICINE

## 2024-05-31 PROCEDURE — 74176 CT ABD & PELVIS W/O CONTRAST: CPT | Performed by: EMERGENCY MEDICINE

## 2024-05-31 PROCEDURE — 72131 CT LUMBAR SPINE W/O DYE: CPT | Performed by: EMERGENCY MEDICINE

## 2024-05-31 PROCEDURE — 71046 X-RAY EXAM CHEST 2 VIEWS: CPT | Performed by: INTERNAL MEDICINE

## 2024-05-31 PROCEDURE — 99223 1ST HOSP IP/OBS HIGH 75: CPT | Performed by: INTERNAL MEDICINE

## 2024-05-31 RX ORDER — VANCOMYCIN HYDROCHLORIDE 125 MG/1
125 CAPSULE ORAL DAILY
Status: DISCONTINUED | OUTPATIENT
Start: 2024-05-31 | End: 2024-06-03

## 2024-05-31 RX ORDER — SODIUM CHLORIDE 9 MG/ML
INJECTION, SOLUTION INTRAVENOUS CONTINUOUS
Status: DISCONTINUED | OUTPATIENT
Start: 2024-05-31 | End: 2024-06-01

## 2024-05-31 RX ORDER — QUETIAPINE FUMARATE 25 MG/1
25 TABLET, FILM COATED ORAL NIGHTLY
Status: DISCONTINUED | OUTPATIENT
Start: 2024-05-31 | End: 2024-06-03

## 2024-05-31 RX ORDER — MIDODRINE HYDROCHLORIDE 5 MG/1
5 TABLET ORAL
Status: DISCONTINUED | OUTPATIENT
Start: 2024-05-31 | End: 2024-06-02

## 2024-05-31 RX ORDER — VENLAFAXINE HYDROCHLORIDE 37.5 MG/1
37.5 CAPSULE, EXTENDED RELEASE ORAL DAILY
Status: DISCONTINUED | OUTPATIENT
Start: 2024-05-31 | End: 2024-06-03

## 2024-05-31 RX ORDER — LEVOTHYROXINE SODIUM 88 UG/1
88 TABLET ORAL
Status: DISCONTINUED | OUTPATIENT
Start: 2024-05-31 | End: 2024-06-02

## 2024-05-31 RX ORDER — FLUDROCORTISONE ACETATE 0.1 MG/1
0.1 TABLET ORAL DAILY
Status: DISCONTINUED | OUTPATIENT
Start: 2024-05-31 | End: 2024-06-03

## 2024-05-31 RX ORDER — ACETAMINOPHEN 325 MG/1
650 TABLET ORAL EVERY 6 HOURS PRN
Status: DISCONTINUED | OUTPATIENT
Start: 2024-05-31 | End: 2024-06-03

## 2024-05-31 RX ORDER — HEPARIN SODIUM 5000 [USP'U]/ML
5000 INJECTION, SOLUTION INTRAVENOUS; SUBCUTANEOUS EVERY 12 HOURS SCHEDULED
Status: DISCONTINUED | OUTPATIENT
Start: 2024-05-31 | End: 2024-06-03

## 2024-05-31 RX ORDER — ATORVASTATIN CALCIUM 20 MG/1
20 TABLET, FILM COATED ORAL DAILY
Status: DISCONTINUED | OUTPATIENT
Start: 2024-05-31 | End: 2024-06-03

## 2024-05-31 NOTE — OCCUPATIONAL THERAPY NOTE
OCCUPATIONAL THERAPY EVALUATION - INPATIENT     Room Number: 545/545-A  Evaluation Date: 5/31/2024  Type of Evaluation: Initial  Presenting Problem: acute cystitis, multiple falls, posterior head pain, L ankle pain, r/o COVID-19  Hx OH+, CKD3, CAD, chronic L1 compression fx     Physician Order: IP Consult to Occupational Therapy  Reason for Therapy: ADL/IADL Dysfunction and Discharge Planning    OCCUPATIONAL THERAPY ASSESSMENT   Patient is a 84 year old female admitted 5/30/2024 for acute cystitis, multiple falls, posterior head pain, L ankle pain, r/o COVID-19.  Patient reported she is independent with ADLs, except assist for bathing, and MI for fx mobility/transfers with an assistive device at baseline. Patient is currently functioning below baseline with ADLs and fx mobility/transfers.  Patient is requiring up to max/total assist for ADLs as a result of the following impairments: decreased functional strength, decreased functional reach, decreased endurance, pain, impaired balance, and decreased muscular endurance. Occupational Therapy will continue to follow for duration of hospitalization.    Patient will benefit from continued skilled OT Services to promote return to prior level of function and safety with continuous assistance and gradual rehabilitative therapy     PLAN  OT Treatment Plan: Balance activities;Energy conservation/work simplification techniques;ADL training;Functional transfer training;Endurance training;Patient/Family education;Equipment eval/education;Patient/Family training;Compensatory technique education     OCCUPATIONAL THERAPY MEDICAL/SOCIAL HISTORY   Problem List  Principal Problem:    Acute cystitis without hematuria  Active Problems:    Acute cough    Altered mental status, unspecified altered mental status type    Left ankle swelling    ALMA DELIA (acute kidney injury) ([facility])    HOME SITUATION  Type of Home: Independent living facility  Home Layout: One level  Lives With: Alone  Drives:  No  Patient Regularly Uses: None    SUBJECTIVE  \"I'm hungry.\"    OCCUPATIONAL THERAPY EXAMINATION    OBJECTIVE  Precautions: None  Fall Risk: High fall risk    PAIN ASSESSMENT  Rating: -- (not rated)  Location: B knees  Management Techniques: Activity promotion; Body mechanics; Repositioning    COGNITION  Orientation Level:  oriented to person/place/situation/year, disoriented to month  Following Commands:  follows one step commands with repetition    SENSATION  Light touch:  intact    RANGE OF MOTION   Upper extremity ROM is within functional limits     STRENGTH ASSESSMENT  Upper extremity strength is within functional limits     ACTIVITIES OF DAILY LIVING ASSESSMENT  AM-PAC ‘6-Clicks’ Inpatient Daily Activity Short Form  How much help from another person does the patient currently need…  -   Putting on and taking off regular lower body clothing?: A Lot  -   Bathing (including washing, rinsing, drying)?: A Lot  -   Toileting, which includes using toilet, bedpan or urinal? : A Lot  -   Putting on and taking off regular upper body clothing?: A Little  -   Taking care of personal grooming such as brushing teeth?: A Little  -   Eating meals?: A Little    AM-PAC Score:  Score: 15  Approx Degree of Impairment: 56.46%  Standardized Score (AM-PAC Scale): 34.69  CMS Modifier (G-Code): CK    BED MOBILITY  Supine <> Sit: Max assist x2  Comments:  Max assist progressing to CGA for static sitting balance at EOB    FUNCTIONAL TRANSFER ASSESSMENT  Unable to safely progress mobility at this time d/t pending L ankle XR.    FUNCTIONAL ADL ASSESSMENT  Eating: min assist   Grooming: min assist (per obs)   UB Dressing: mod assist  LB Dressing: max assist  Toileting: total assist (per obs)     EDUCATION PROVIDED  Patient: Role of Occupational Therapy; Plan of Care; Discharge Recommendations; Posture/Positioning; Proper Body Mechanics  Patient's Response to Education: Requires Further Education    The patient's Approx Degree of  Impairment: 56.46% has been calculated based on documentation in the [facility] '6 clicks' Inpatient Daily Activity Short Form.  Research supports that patients with this level of impairment may benefit from rehab.  Final disposition will be made by interdisciplinary medical team.     Patient End of Session: Call light within reach;In bed;Needs met;RN aware of session/findings;All patient questions and concerns addressed;Alarm set    OT Goals  Patients self stated goal is: none stated     Patient will complete functional transfer with Max A x1  Comment:     Patient will sit EOB with SUP in prep for ADLs  Comment:     Patient will tolerate standing for 1-2 minutes in prep for adls with Max A x1  Comment:    Patient will complete oral/facial grooming task in supported sitting with Setup Assist  Comment:            Goals  on: 24  Frequency: 3-5x/week    Patient Evaluation Complexity Level:   Occupational Profile/Medical History MODERATE - Expanded review of history including review of medical or therapy record   Specific performance deficits impacting engagement in ADL/IADL HIGH  5+ performance deficits    Client Assessment/Performance Deficits HIGH - Comorbidities and significant modifications of tasks    Clinical Decision Making MODERATE - Analysis of occupational profile, detailed assessments, several treatment options    Overall Complexity MODERATE     OT Session Time  Therapeutic Activity: 10 minutes    NIRAV Lombardo/L  [facility]  #[id number]

## 2024-05-31 NOTE — ED PROVIDER NOTES
Patient Seen in: [facility] Emergency Department    History     Chief Complaint   Patient presents with    Altered Mental Status     Stated Complaint:      HPI    84-year-old female with past medical history of hypothyroid, orthostatic hypotension presenting by EMS from [facility] for evaluation of altered mental status with unclear duration.  Patient oriented to year though slow to respond and without overt somatic complaints aside from chronic/unchanged cephalgia.    Past Medical History:    Abdominal aortic aneurysm (AAA) without rupture (HCC)    Acquired hypothyroidism    Agatston CAC score, >400    Arthritis    Atherosclerosis of aorta (HCC)    Chronic bronchitis (HCC)    Closed compression fracture of L1 vertebra (HCC)    Depression, recurrent (HCC)    Hypothyroid    Orthostatic hypertension       Past Surgical History:   Procedure Laterality Date    Appendectomy      Hip replacement surgery Right     Hysterectomy      Other surgical history  4/16/16     SBO            Family History   Problem Relation Age of Onset    Stroke Mother     Schizophrenia Daughter     Other (hodkins lymphoma) Son        Social History     Socioeconomic History    Marital status:    Tobacco Use    Smoking status: Every Day     Current packs/day: 0.25     Types: Cigarettes    Smokeless tobacco: Never    Tobacco comments:     4-5; 60 years   Vaping Use    Vaping status: Never Used   Substance and Sexual Activity    Alcohol use: No    Drug use: No    Sexual activity: Never       Review of Systems : LIMITED BY CLINICAL CONDITION  Constitutional: As per HPI  Respiratory: Negative for cough and shortness of breath.    Neurological: Negative for syncope; (+) headaches.     Positive for stated complaint:   Other systems are as noted in HPI.  Constitutional and vital signs reviewed.      All other systems reviewed and negative except as noted above.    PSFH elements reviewed from today and agreed except as  otherwise stated in HPI.    Physical Exam     ED Triage Vitals [05/31/24 0000]   /57   Pulse 82   Resp 15   Temp 97.8 °F (36.6 °C)   Temp src Oral   SpO2 94 %   O2 Device None (Room air)       Current:/57   Pulse 82   Temp 97.8 °F (36.6 °C) (Oral)   Resp 15   SpO2 94%         Physical Exam   Constitutional: No distress.   HEENT: Dry MM.  Head: Normocephalic.  Atraumatic.  Eyes: No injection.  Pupils midrange and equal reactive.  No photophobia.  Neck: Neck supple.  No meningismus.  Cardiovascular: RRR.   Pulmonary/Chest: Effort normal. CTAB.  Abdominal: Soft.  Nontender.  Musculoskeletal: No gross deformity.  Neurological: Awake though slow to respond, oriented to hospital/person/year.  Aware of presence in the emergency department though unsure reason for EMS call. Cranial nerves II through XII grossly intact. Bilateral upper and lower extremities with 5/5 strength proximally and distally.    Skin: Skin is warm.   Psychiatric: Cooperative.  Nursing note and vitals reviewed.        ED Course     Labs Reviewed   URINALYSIS WITH CULTURE REFLEX - Abnormal; Notable for the following components:       Result Value    Clarity Urine Turbid (*)     Protein Urine Trace (*)     Leukocyte Esterase Urine 500 (*)     WBC Urine 21-50 (*)     Bacteria Urine Rare (*)     Squamous Epi. Cells Few (*)     Hyaline Casts Present (*)     All other components within normal limits   COMP METABOLIC PANEL (14) - Abnormal; Notable for the following components:    Glucose 101 (*)     Creatinine 1.12 (*)     BUN/CREA Ratio 20.5 (*)     Calculated Osmolality 296 (*)     eGFR-Cr 48 (*)     All other components within normal limits   TSH W REFLEX TO FREE T4 - Abnormal; Notable for the following components:    TSH 0.390 (*)     All other components within normal limits   CBC W/ DIFFERENTIAL - Abnormal; Notable for the following components:    WBC 15.4 (*)     RDW-SD 47.8 (*)     Neutrophil Absolute Prelim 10.72 (*)     Neutrophil  Absolute 10.72 (*)     Monocyte Absolute 1.39 (*)     All other components within normal limits   ETHYL ALCOHOL - Normal   CK CREATINE KINASE (NOT CREATININE) - Normal   T4, FREE (S) - Normal   CBC WITH DIFFERENTIAL WITH PLATELET    Narrative:     The following orders were created for panel order CBC With Differential With Platelet.  Procedure                               Abnormality         Status                     ---------                               -----------         ------                     CBC W/ DIFFERENTIAL[[id number]]          Abnormal            Final result                 Please view results for these tests on the individual orders.   VENOUS BLOOD GAS   LACTIC ACID, PLASMA   FREE T3 (TRIIODOTHYRONINE)   RAINBOW DRAW LAVENDER   RAINBOW DRAW LIGHT GREEN   RAINBOW DRAW BLUE   RAINBOW DRAW GOLD   URINE CULTURE, ROUTINE   BLOOD CULTURE   BLOOD CULTURE       [facility]  part of [facility]      Sepsis Reassessment Note    /60   Pulse 83   Temp 97.8 °F (36.6 °C) (Oral)   Resp 11   Wt 63.5 kg   SpO2 90%   BMI 24.03 kg/m²      I completed the sepsis reassessment at 0522    Cardiac:  Regularity: Regular  Rate: Normal  Heart Sounds: S1,S2    Lungs:   Right: Clear  Left: Clear    Peripheral Pulses:  Radial: Right 1+ or Left 1+      Capillary Refill:  <3 Secs    Skin:  Temp/Moisture: Warm and Dry  Color: Normal      Maxi York MD  5/31/2024  5:22 AM        Preliminary Radiology Report  [facility], [facility]  [phone] - Phone    [facility]    NAME: ROSI GERARDO    DATE OF EXAM: 05/31/2024  Patient No:  [id number]  Physician:  FRANCHESCA^MAXI^2  YOB: 1940    Past Medical History (entered by Technologist):    Reason For Exam (entered by Technologist):  FALL.  Other Notes (entered by Technologist): ROOM 37/ [facility]    Additional Information (per [facility]): Fall    HEAD CT WITHOUT CONTRAST    Comparison: 12/20/23    IMPRESSION    No  identifiable soft tissue swelling.  No intracranial hemorrhage, hydrocephalus or acute fracture    Moderate periventricular, deep and subcortical white matter hypodensity is most consistent with chronic small vessel ischemia  Mild to moderate generalized parenchymal volume loss    The orbits demonstrate mild divergent gaze.  Visualized sinuses are well-aerated  The mastoids are unremarkable    The results were faxed at 1:10 AM central standard time. If you would like to discuss this case directly please call [phone] ([id number]).         Marc Gosselin, M.D.  This report has been electronically signed and verified by the Radiologist whose name is printed above.     Preliminary Radiology Report  [facility], [facility]  [phone] - Phone    [facility]    NAME: ROSI GERARDO    DATE OF EXAM: 05/31/2024  Patient No:  [id number]  Physician:  FRANCHESCA^KATHY^2  YOB: 1940    Past Medical History (entered by Technologist):    Reason For Exam (entered by Technologist):  Abd pain, low back pain.  Other Notes (entered by Technologist): ROOM / [id number]    Additional Information (per [facility]):      CT abdomen and pelvis without contrast      IMPRESSION:    Indeterminate hypodense lesion in the right hepatic lobe measuring 3.4 x 2.3 cm on axial image 37.  The gallbladder, spleen, pancreas, and adrenals appear normal.  The kidneys show no acute process.  Simple cyst noted in the lateral left kidney measuring 17 mm in diameter.  Diffuse calcification in the abdominal aorta without aneurysm.  The stomach and small bowel appear normal.  The appendix is not clearly identified.  Subtle appendicitis is not excluded.  The colon appears normal.  The urinary bladder appears grossly normal but is not well-visualized.  Beam hardening artifact arising from the patient's bilateral hip arthroplasties limits evaluation of the pelvic structures.  Moderate osteopenia.  Degenerative changes in the  spine.  Unremarkable lung bases.    Report sent at 05:58 AM ET    Jonathon Baker MD  This report has been electronically signed and verified by the Radiologist whose name is printed above.  MDM   DIFFERENTIAL DIAGNOSIS: After history and physical exam differential diagnosis includes but is not limited to sepsis, bacteremia, UTI, intracranial hemorrhage/contusion, hypercapnea.    Pulse ox: 94%:Normal on RA, as independently interpreted by myself    Medical Decision Making  Evaluation for altered mentation of unclear duration and patient without focal neurologic deficits - CTH nonacute, labs/UA noted with antibiotics initiated in normotensive patient and cultures sent for which patient to be admitted for ongoing management, case d/w PCP coverage Dr. Bajwa for admission; CT abd/pelvis nonacute, VBG without hypercapnea.    Problems Addressed:  Acute cystitis without hematuria: complicated acute illness or injury with systemic symptoms  Altered mental status, unspecified altered mental status type: acute illness or injury    Amount and/or Complexity of Data Reviewed  External Data Reviewed: radiology and notes.     Details: SNF notes reviewed  Labs: ordered. Decision-making details documented in ED Course.  Radiology: ordered and independent interpretation performed. Decision-making details documented in ED Course.     Details: CTH without obvious ICH as independently interpreted by myself  Discussion of management or test interpretation with external provider(s): Case d/w Dr. Bajwa for admission    Risk  Prescription drug management.  Decision regarding hospitalization.      I was wearing at minimum a facemask and eye protection throughout this encounter with handwashing performed prior and after patient evaluation without personal hand/facial/oropharyngeal contact and gloves worn throughout encounter. See note and/or contact this provider for further PPE details.    Disposition and Plan     Clinical  Impression:  1. Acute cystitis without hematuria    2. Altered mental status, unspecified altered mental status type        Disposition:  Admit    Follow-up:  No follow-up provider specified.    Medications Prescribed:  Current Discharge Medication List

## 2024-05-31 NOTE — PHYSICAL THERAPY NOTE
PHYSICAL THERAPY EVALUATION - INPATIENT     Room Number: 545/545-A  Evaluation Date: 5/31/2024  Type of Evaluation: Initial   Physician Order: PT Eval and Treat    Presenting Problem: UTI     Reason for Therapy: Mobility Dysfunction and Discharge Planning    PHYSICAL THERAPY ASSESSMENT   Patient is a 84 year old female admitted 5/30/2024 for UTI.  Prior to admission, patient's baseline is independent in ADL's and ambulation with assistive device.  Patient is currently functioning below baseline with bed mobility and transfers.  Patient is requiring maximum assist and assist of 2  as a result of the following impairments: decreased functional strength, pain, and medical status.  Physical Therapy will continue to follow for duration of hospitalization.    Patient will benefit from continued skilled PT Services to promote return to prior level of function and safety with continuous assistance and gradual rehabilitative therapy .    PLAN  PT Treatment Plan: Bed mobility;Body mechanics;Patient education;Transfer training;Balance training  Rehab Potential : Fair  Frequency (Obs): 3-5x/week    PHYSICAL THERAPY MEDICAL/SOCIAL HISTORY   Problem List  Principal Problem:    Acute cystitis without hematuria  Active Problems:    Acute cough    Altered mental status, unspecified altered mental status type    Left ankle swelling    ALMA DELIA (acute kidney injury) ([facility])    HOME SITUATION  Home Situation  Type of Home: Independent living facility  Home Layout: One level  Stairs to Enter : 0  Railing: No  Stairs to Bedroom: 0  Railing: No  Lives With: Alone  Drives: No  Patient Owned Equipment: Rolling walker  Patient Regularly Uses: None     SUBJECTIVE  \"I want to eat first\"    PHYSICAL THERAPY EXAMINATION   OBJECTIVE  Precautions: None  Fall Risk: High fall risk    WEIGHT BEARING RESTRICTION  Weight Bearing Restriction: None                PAIN ASSESSMENT  Rating: Unable to rate  Location: legs  Management Techniques: Activity  promotion;Body mechanics;Repositioning    COGNITION  Overall Cognitive Status:  WFL - within functional limits    RANGE OF MOTION AND STRENGTH ASSESSMENT  Lower extremity ROM is within functional limits  BLE WNL  Lower extremity strength is within functional limits  BLE WNL    BALANCE  Static Sitting: Poor +  Dynamic Sitting: Poor  Static Standing: Not tested  Dynamic Standing: Not tested    AM-PAC '6-Clicks' INPATIENT SHORT FORM - BASIC MOBILITY  How much difficulty does the patient currently have...  Patient Difficulty: Turning over in bed (including adjusting bedclothes, sheets and blankets)?: A Lot   Patient Difficulty: Sitting down on and standing up from a chair with arms (e.g., wheelchair, bedside commode, etc.): Unable   Patient Difficulty: Moving from lying on back to sitting on the side of the bed?: A Lot   How much help from another person does the patient currently need...   Help from Another: Moving to and from a bed to a chair (including a wheelchair)?: Total   Help from Another: Need to walk in hospital room?: Total   Help from Another: Climbing 3-5 steps with a railing?: Total     AM-PAC Score:  Raw Score: 8   Approx Degree of Impairment: 86.62%   Standardized Score (AM-PAC Scale): 28.58   CMS Modifier (G-Code): CM    FUNCTIONAL ABILITY STATUS  Functional Mobility/Gait Assessment  Gait Assistance: Not tested  Rolling:  Not tested   Supine to Sit: maximum assist and assist of 2  Sit to Supine: maximum assist and assist of 2  Sit to Stand:  not tested     Exercise/Education Provided:  Bed mobility  Body mechanics    The patient's Approx Degree of Impairment: 86.62% has been calculated based on documentation in the [facility] '6 clicks' Inpatient Basic Mobility Short Form.  Research supports that patients with this level of impairment may benefit from long term care, however patient would benefit from rehab facility. Patient received semi-fowlers in bed, agreeable to physical therapy evaluation. Patient on  room air. Patient currently with max A x 2 for mobility during the session. Patient able to sit edge of bed for about 5 minutes with max A x 1 progressing to CGA for supported sitting edge of bed. Education with patient provided verbally on physical therapy plan of care and physiological benefits of out of bed mobility. Next session anticipate to progress bed mobility, transfers, and gait. Patient with left lean while sitting edge of bed.     Patient history and/or personal factors that may impact the plan of care include home accessibility concerns. Based on the physical therapy examination of the noted systems and functional activity/participation limitations, the patient presentation is evolving given the patient demonstrates worsening of previously stable condition and demonstrates worsening of co-morbidities. Final disposition will be made by interdisciplinary medical team.    Patient End of Session: In bed;Needs met;Call light within reach;RN aware of session/findings;All patient questions and concerns addressed;Alarm set    CURRENT GOALS  Goals to be met by: 6/15/24  Patient Goal Patient's self-stated goal is: to go home   Goal #1 Patient is able to demonstrate supine - sit EOB @ level: minimum assistance     Goal #1   Current Status    Goal #2 Patient is able to demonstrate transfers Sit to/from Stand at assistance level: minimum assistance with walker - rolling     Goal #2  Current Status    Goal #3 Patient is able to ambulate 10 feet with assist device: walker - rolling at assistance level: minimum assistance   Goal #3   Current Status    Goal #4    Goal #4   Current Status    Goal #5 Patient to demonstrate independence with home activity/exercise instructions provided to patient in preparation for discharge.   Goal #5   Current Status    Goal #6    Goal #6  Current Status      Patient Evaluation Complexity Level:  History Moderate - 1 or 2 personal factors and/or co-morbidities   Examination of body  systems Moderate - addressing a total of 3 or more elements   Clinical Presentation  Moderate - Evolving   Clinical Decision Making  Moderate Complexity     Therapeutic Activity:  15 minutes

## 2024-05-31 NOTE — PLAN OF CARE
Problem: Patient Centered Care  Goal: Patient preferences are identified and integrated in the patient's plan of care  Description: Interventions:  - What would you like us to know as we care for you? Pt from [facility]  - Provide timely, complete, and accurate information to patient/family  - Incorporate patient and family knowledge, values, beliefs, and cultural backgrounds into the planning and delivery of care  - Encourage patient/family to participate in care and decision-making at the level they choose  - Honor patient and family perspectives and choices  Outcome: Progressing     Problem: PAIN - ADULT  Goal: Verbalizes/displays adequate comfort level or patient's stated pain goal  Description: INTERVENTIONS:  - Encourage pt to monitor pain and request assistance  - Assess pain using appropriate pain scale  - Administer analgesics based on type and severity of pain and evaluate response  - Implement non-pharmacological measures as appropriate and evaluate response  - Consider cultural and social influences on pain and pain management  - Manage/alleviate anxiety  - Utilize distraction and/or relaxation techniques  - Monitor for opioid side effects  - Notify MD/LIP if interventions unsuccessful or patient reports new pain  - Anticipate increased pain with activity and pre-medicate as appropriate  Outcome: Progressing     Problem: SAFETY ADULT - FALL  Goal: Free from fall injury  Description: INTERVENTIONS:  - Assess pt frequently for physical needs  - Identify cognitive and physical deficits and behaviors that affect risk of falls.  - [place] fall precautions as indicated by assessment.  - Educate pt/family on patient safety including physical limitations  - Instruct pt to call for assistance with activity based on assessment  - Modify environment to reduce risk of injury  - Provide assistive devices as appropriate  - Consider OT/PT consult to assist with strengthening/mobility  - Encourage toileting  schedule  Outcome: Dylon Wooten was from [facility], admitted from ED with c/o AMS. Pt alert and oriented but can be forgetful. Spoke with MD and orders carried out. Pt on tele. IVF infusion started. Report given to dayshift nurses.

## 2024-05-31 NOTE — ED INITIAL ASSESSMENT (HPI)
Pt here via EMS from [facility]. Pt's grandson was present upon EMS arrival. Grandson called EMS for AMS. Grandson stated that pt lives in the independent living and normally walks downstairs to smoke outside.Pt has had multiple falls recently. C/o of posterior head pain that is not new.

## 2024-05-31 NOTE — ED QUICK NOTES
Orders for admission, patient is aware of plan and ready to go upstairs. Any questions, please call ED KIYA Smith at extension [phone].     Patient Covid vaccination status: Fully vaccinated     COVID Test Ordered in ED: None    COVID Suspicion at Admission: N/A    Running Infusions:  None    Mental Status/LOC at time of transport: A&Ox3    Other pertinent information: Incontinent. Needs assistance with ADLs  CIWA score: N/A   NIH score:  N/A

## 2024-05-31 NOTE — PLAN OF CARE
Patient a/ox2-3. RA. Tele in place. Continue with IV fluids. No complaints of pain at this time. Covid/flu/respiratory panel pending. Safety precautions in place. Call light within reach.   Problem: Patient Centered Care  Goal: Patient preferences are identified and integrated in the patient's plan of care  Description: Interventions:  - What would you like us to know as we care for you?   - Provide timely, complete, and accurate information to patient/family  - Incorporate patient and family knowledge, values, beliefs, and cultural backgrounds into the planning and delivery of care  - Encourage patient/family to participate in care and decision-making at the level they choose  - Honor patient and family perspectives and choices  Outcome: Progressing     Problem: Patient/Family Goals  Goal: Patient/Family Long Term Goal  Description: Patient's Long Term Goal:     Interventions:  -   - See additional Care Plan goals for specific interventions  Outcome: Progressing  Goal: Patient/Family Short Term Goal  Description: Patient's Short Term Goal:     Interventions:   -   - See additional Care Plan goals for specific interventions  Outcome: Progressing     Problem: PAIN - ADULT  Goal: Verbalizes/displays adequate comfort level or patient's stated pain goal  Description: INTERVENTIONS:  - Encourage pt to monitor pain and request assistance  - Assess pain using appropriate pain scale  - Administer analgesics based on type and severity of pain and evaluate response  - Implement non-pharmacological measures as appropriate and evaluate response  - Consider cultural and social influences on pain and pain management  - Manage/alleviate anxiety  - Utilize distraction and/or relaxation techniques  - Monitor for opioid side effects  - Notify MD/LIP if interventions unsuccessful or patient reports new pain  - Anticipate increased pain with activity and pre-medicate as appropriate  Outcome: Progressing     Problem: SAFETY ADULT -  FALL  Goal: Free from fall injury  Description: INTERVENTIONS:  - Assess pt frequently for physical needs  - Identify cognitive and physical deficits and behaviors that affect risk of falls.  - [place] fall precautions as indicated by assessment.  - Educate pt/family on patient safety including physical limitations  - Instruct pt to call for assistance with activity based on assessment  - Modify environment to reduce risk of injury  - Provide assistive devices as appropriate  - Consider OT/PT consult to assist with strengthening/mobility  - Encourage toileting schedule  Outcome: Progressing     Problem: DISCHARGE PLANNING  Goal: Discharge to home or other facility with appropriate resources  Description: INTERVENTIONS:  - Identify barriers to discharge w/pt and caregiver  - Include patient/family/discharge partner in discharge planning  - Arrange for needed discharge resources and transportation as appropriate  - Identify discharge learning needs (meds, wound care, etc)  - Arrange for interpreters to assist at discharge as needed  - Consider post-discharge preferences of patient/family/discharge partner  - Complete POLST form as appropriate  - Assess patient's ability to be responsible for managing their own health  - Refer to Case Management Department for coordinating discharge planning if the patient needs post-hospital services based on physician/LIP order or complex needs related to functional status, cognitive ability or social support system  Outcome: Progressing

## 2024-05-31 NOTE — CONGREGATE LIVING REVIEW
[facility] Living Authorization    The [facility] Review Committee has reviewed this case and the patient IS APPROVED for discharge to a facility for Short Term Skilled once the following procedure is followed:     - The physician discharge instructions (contained within the SAMMIE note for SNF) must inlcude the below appropriate and approved COVID instructions to the facility    For questions regarding CLRC approval process, please contact the CM assigned to the case.  For questions regarding RN discharge workflow, please contact the unit Clinical Leader.

## 2024-05-31 NOTE — H&P
[facility]  part of [facility]    History and Physical    Sugar Still Patient Status:  Inpatient    1940 MRN [id number]   Location [facility] 5SW/SE Attending Eduarda Bajwa MD   Hosp Day # 0 PCP Telly Li MD     Date:  2024  Date of Admission:  2024    History provided by:patient and prior records  HPI:     Chief Complaint   Patient presents with    Altered Mental Status     Ms. Still is an 83 yo resident of [facility] presenting with altered mental status and weakness. Pt doesn't exactly recall why she came in for evaluation but does report having worsening cough the past few days. Cough is nonproductive. She denies fever or change in appetite.   Spoke with [place] staff. She resides in independent living. Wasn't getting up as much. Had a fall a few days ago but pt was eased to the ground. She did not hit her head at the time. Staff did not notice changes in speech or focal deficits.     Her past medical history includes orthostatic hypotension, limited mobility, chronic fatigue, urinary incontinence, cervical spondylosis, CKD stage 3, hypothyroidism, depression, smoking.   Per prior office notes, she is only able to walk 50 ft d/t orthostatic hypotension. She utilizes a wheelchair to navigate other areas of the facility.               History     Past Medical History:    Abdominal aortic aneurysm (AAA) without rupture (HCC)    Acquired hypothyroidism    Agatston CAC score, >400    Arthritis    Atherosclerosis of aorta (HCC)    Chronic bronchitis (HCC)    Closed compression fracture of L1 vertebra (HCC)    Depression, recurrent (HCC)    Hypothyroid    Orthostatic hypertension     Past Surgical History:   Procedure Laterality Date    Appendectomy      Hip replacement surgery Right     Hysterectomy      Other surgical history  16     SBO     Family History   Problem Relation Age of Onset    Stroke Mother     Schizophrenia Daughter     Other  (hodkins lymphoma) Son      Social History:  Social History     Socioeconomic History    Marital status:    Tobacco Use    Smoking status: Every Day     Current packs/day: 0.25     Types: Cigarettes    Smokeless tobacco: Never    Tobacco comments:     4-5; 60 years   Vaping Use    Vaping status: Never Used   Substance and Sexual Activity    Alcohol use: No    Drug use: No    Sexual activity: Never     Allergies/Medications:   Allergies: No Known Allergies  Medications Prior to Admission   Medication Sig    amoxicillin 500 MG Oral Cap Take 1 capsule (500 mg total) by mouth in the morning, at noon, and at bedtime.    lidocaine-menthol 4-1 % External Patch Place 1 patch onto the skin daily.    vancomycin 125 MG Oral Cap Take 1 capsule (125 mg total) by mouth daily.    QUEtiapine 25 MG Oral Tab Take 1 tablet (25 mg total) by mouth nightly.    midodrine 5 MG Oral Tab Take 1 tablet (5 mg total) by mouth 2 (two) times daily before meals.    venlafaxine ER 37.5 MG Oral Capsule SR 24 Hr Take 1 capsule (37.5 mg total) by mouth daily.    levothyroxine 88 MCG Oral Tab Take 1 tablet (88 mcg total) by mouth before breakfast.    ATORVASTATIN 20 MG Oral Tab Take 1 tablet (20 mg total) by mouth daily.    FLUDROCORTISONE 0.1 MG Oral Tab Take 1 tablet (0.1 mg total) by mouth daily.    gabapentin 100 MG Oral Cap TAKE 1 CAPSULE BY MOUTH THREE TIMES DAILY AS NEEDED FOR PAIN       Review of Systems:     Constitutional:  Positive for fatigue. Negative for activity change, appetite change, chills and fever.   HENT: Negative.     Respiratory:  Positive for cough. Negative for shortness of breath and wheezing.    Cardiovascular: Negative.    Gastrointestinal: Negative.    Genitourinary: Negative.    Neurological:  Positive for weakness.       Physical Exam:   Vital Signs:  Blood pressure 145/69, pulse 80, temperature 98.6 °F (37 °C), temperature source Oral, resp. rate 16, weight 144 lb 6.4 oz (65.5 kg), SpO2 96%, not currently  breastfeeding.  Physical Exam  Constitutional:       Appearance: Normal appearance.   HENT:      Mouth/Throat:      Mouth: Mucous membranes are moist.   Eyes:      Extraocular Movements: Extraocular movements intact.      Conjunctiva/sclera: Conjunctivae normal.      Pupils: Pupils are equal, round, and reactive to light.   Cardiovascular:      Rate and Rhythm: Normal rate and regular rhythm.      Heart sounds: No murmur heard.  Pulmonary:      Effort: Pulmonary effort is normal. No respiratory distress.      Breath sounds: Normal breath sounds.      Comments: cough  Abdominal:      General: Abdomen is flat. There is no distension.      Palpations: Abdomen is soft.   Musculoskeletal:      Comments: L ankle tenderness to palpation over medial malleolus, slight edema   Skin:     General: Skin is warm.   Neurological:      General: No focal deficit present.      Mental Status: She is alert and oriented to person, place, and time.       Results:     Lab Results   Component Value Date    WBC 15.4 (H) 05/30/2024    HGB 13.3 05/30/2024    HCT 42.2 05/30/2024    .0 05/30/2024    CREATSERUM 1.12 (H) 05/30/2024    BUN 23 05/30/2024     05/30/2024    K 4.5 05/30/2024     05/30/2024    CO2 27.0 05/30/2024     (H) 05/30/2024    CA 9.8 05/30/2024    ALB 4.1 05/30/2024    ALKPHO 79 05/30/2024    BILT 1.0 05/30/2024    TP 7.0 05/30/2024    AST 22 05/30/2024    ALT 14 05/30/2024    INR 1.0 10/08/2015    T4F 0.9 05/30/2024    TSH 0.390 (L) 05/30/2024    MG 2.1 09/12/2023    CK 48 05/30/2024    B12 1,861 (H) 08/17/2023    ETOH <3 05/30/2024     No results found.        Assessment/Plan:     Acute cystitis without hematuria  UA +WBCs, +LE, await urine and blood culture  Lactic acid normal  Initial WBC 15  Continue rocephin    Cough  Check cxr     Altered mental status, unspecified altered mental status type  Possibly 2/2 infection, dehydration  Head CT negative    Ankle pain  Check xray    ALMA DELIA on CKD stage  3  Baseline Cr 0.8; on admission 1.12  Gentle IVF     Orthostatic hypotension  Likely a cause of her dizziness.    - 2D echo: unremarkable  - Ongoing conservative measures at home  - C/w home fludrocortisone and Midodrine increased to 5 mg twice a day; should watch bps closely     Fatigue  Seems to be a chronic issue.  Likely multifactorial in the setting of aging, urinary incontinence leading to nocturia up to 4 times a night, poor p.o. intake.  - Chronic ongoing issue, will monitor with further resolution of UTI  - Liothyronine added for hypothyroidism     Urinary incontinence  Has been requiring the use of pads, most prominent at nighttime.  - Stop oxybutinin as not helping at this time     Neck pain  Related to a fall due to dizziness about 1 month ago.  Still some soreness in the occipital area, intermittent.  Has improved however  -Cervical x-ray did show spondylosis most prominent on at C4-C5, C5-C6, C6-C7  - Check CT neck today  - Trial of alternating Tylenol, ibuprofen.  Remained stable on current regimen.  Otherwise remained stable     Joint stiffness  Localized to both hands, ongoing for quite some time.  Suspect osteoarthritis as no evidence of tenosynovitis on my examination  - Check x-ray of the bilateral hands: Prior x-rays 5/2022 did demonstrate osteoarthritis  - BATSHEVA was 1: 80, though overall low suspicion for rheumatological disease  - Can perform home exercises in addition to continuing with Tylenol on an as-needed basis for symptom support     CKD stage IIIa  Stable, creatinine 0.88/eGFR 65 -> 0.84/eGFR 69  - We will monitor      Coronary artery disease  Hx of Calcium score > 400, follows with Dr. Jimenez, last seen 2/24/2022  -We will need to establish with a new cardiologist given Dr. Jimenez's recent USP  - Seems to be stable     Hypothyroidism  - Repeat TFTs to monitor stability at next follow-up visit  - Levothyroxine 88 mcg  - Liothyronine added 5 mcg     Chronic L1 compression fracture  -  On home gabapentin 100 mg TID PRN  - Stable on current regimen     Depression  -Effexor 37.5 mg once a day.       Active tobacco use  - Evidence of emphysema on CT calcium score 2/24/2022  -Discussed the need to stop smoking altogether, aware of the risk for lung cancer, COPD  -Recommended gradual weaning off of cigarettes by decreasing 1-2 cigarettes every 1-2 weeks.  -We also discussed nicotine replacement therapy  - Currently smoking about 7 cigarettes/day    **Certification      PHYSICIAN Certification of Need for Inpatient Hospitalization - Initial Certification    Patient will require inpatient services that will reasonably be expected to span two midnight's based on the clinical documentation in H+P.   Based on patients current state of illness, I anticipate that, after discharge, patient will require TBD.        Stefanie Treviño DO  5/31/2024

## 2024-06-01 LAB
ANION GAP SERPL CALC-SCNC: 6 MMOL/L (ref 0–18)
BASOPHILS # BLD AUTO: 0.07 X10(3) UL (ref 0–0.2)
BASOPHILS NFR BLD AUTO: 0.9 %
BUN BLD-MCNC: 16 MG/DL (ref 9–23)
BUN/CREAT SERPL: 19.8 (ref 10–20)
CALCIUM BLD-MCNC: 8.9 MG/DL (ref 8.7–10.4)
CHLORIDE SERPL-SCNC: 112 MMOL/L (ref 98–112)
CO2 SERPL-SCNC: 25 MMOL/L (ref 21–32)
CREAT BLD-MCNC: 0.81 MG/DL
DEPRECATED RDW RBC AUTO: 46 FL (ref 35.1–46.3)
EGFRCR SERPLBLD CKD-EPI 2021: 72 ML/MIN/1.73M2 (ref 60–?)
EOSINOPHIL # BLD AUTO: 0.31 X10(3) UL (ref 0–0.7)
EOSINOPHIL NFR BLD AUTO: 4.1 %
ERYTHROCYTE [DISTWIDTH] IN BLOOD BY AUTOMATED COUNT: 13.2 % (ref 11–15)
FLUAV + FLUBV RNA SPEC NAA+PROBE: NOT DETECTED
FLUAV + FLUBV RNA SPEC NAA+PROBE: NOT DETECTED
GLUCOSE BLD-MCNC: 88 MG/DL (ref 70–99)
HCT VFR BLD AUTO: 34.2 %
HGB BLD-MCNC: 11.9 G/DL
IMM GRANULOCYTES # BLD AUTO: 0.01 X10(3) UL (ref 0–1)
IMM GRANULOCYTES NFR BLD: 0.1 %
LYMPHOCYTES # BLD AUTO: 3.86 X10(3) UL (ref 1–4)
LYMPHOCYTES NFR BLD AUTO: 51.5 %
MCH RBC QN AUTO: 33.2 PG (ref 26–34)
MCHC RBC AUTO-ENTMCNC: 34.8 G/DL (ref 31–37)
MCV RBC AUTO: 95.5 FL
MONOCYTES # BLD AUTO: 0.79 X10(3) UL (ref 0.1–1)
MONOCYTES NFR BLD AUTO: 10.5 %
NEUTROPHILS # BLD AUTO: 2.46 X10 (3) UL (ref 1.5–7.7)
NEUTROPHILS # BLD AUTO: 2.46 X10(3) UL (ref 1.5–7.7)
NEUTROPHILS NFR BLD AUTO: 32.9 %
OSMOLALITY SERPL CALC.SUM OF ELEC: 297 MOSM/KG (ref 275–295)
PLATELET # BLD AUTO: 240 10(3)UL (ref 150–450)
POTASSIUM SERPL-SCNC: 4 MMOL/L (ref 3.5–5.1)
RBC # BLD AUTO: 3.58 X10(6)UL
RSV RNA SPEC NAA+PROBE: NOT DETECTED
SARS-COV-2 RNA RESP QL NAA+PROBE: NOT DETECTED
SODIUM SERPL-SCNC: 143 MMOL/L (ref 136–145)
WBC # BLD AUTO: 7.5 X10(3) UL (ref 4–11)

## 2024-06-01 PROCEDURE — 99232 SBSQ HOSP IP/OBS MODERATE 35: CPT | Performed by: INTERNAL MEDICINE

## 2024-06-01 NOTE — PROGRESS NOTES
[facility]  part of [facility]    Progress Note    S[place] Still Patient Status:  Inpatient    1940 MRN [id number]   Location [facility] 5SW/SE Attending Eduarda Bajwa MD   Hosp Day # 1 PCP Telly Li MD       SUBJECTIVE:  Pt woke up late this am; just ordered breakfast.  States she is tired and hungry.  Denies abdominal pain    OBJECTIVE:  Vital signs in last 24 hours:  BP (!) 161/68 (BP Location: Right arm)   Pulse 73   Temp 99.6 °F (37.6 °C) (Oral)   Resp 18   Wt 144 lb 6.4 oz (65.5 kg)   SpO2 93%   BMI 24.79 kg/m²     Intake/Output:    Intake/Output Summary (Last 24 hours) at 2024 1146  Last data filed at 2024 0600  Gross per 24 hour   Intake 1223.25 ml   Output 1600 ml   Net -376.75 ml       Wt Readings from Last 3 Encounters:   24 144 lb 6.4 oz (65.5 kg)   23 143 lb 4.8 oz (65 kg)   23 143 lb 11.2 oz (65.2 kg)       EXAM:  GENERAL: well developed, well nourished, in no apparent distress  LUNGS: clear to auscultation  CARDIO: RRR, normal S1S2, without murmur or gallop  GI: soft, NT, ND, NABS, no HSM  EXTREMITIES: no cyanosis, clubbing or edema    Data Review:     Labs:   Lab Results   Component Value Date    WBC 7.5 2024    HGB 11.9 2024    HCT 34.2 2024    .0 2024    CREATSERUM 0.81 2024    BUN 16 2024     2024    K 4.0 2024     2024    CO2 25.0 2024    GLU 88 2024    CA 8.9 2024         Imaging:  CT SPINE LUMBAR (CPT=72131)    Result Date: 2024  CONCLUSION:   No acute fracture or malalignment of the lumbar spine.  Dextroconvex scoliosis and moderate to advanced arthropathy.  Multilevel spinal canal and foraminal stenosis secondary to multifactorial degenerative changes.    Preliminary report was given by [facility].  There are no clinically significant discrepancies.     Dictated by (CST): Maged Gonzalez MD on 2024 at 1:02 PM      Finalized by (CST): Maged Gonzalez MD on 5/31/2024 at 1:18 PM          XR ANKLE (MIN 3 VIEWS), LEFT (CPT=73610)    Result Date: 5/31/2024  CONCLUSION:   No acute fracture or dislocation.  Soft tissue swelling about the ankle.  Demineralized osseous structures.     Dictated by (CST): Tesfaye Desai MD on 5/31/2024 at 1:11 PM     Finalized by (CST): Tesfaye Desai MD on 5/31/2024 at 1:13 PM          XR CHEST PA + LAT CHEST (CPT=71046)    Result Date: 5/31/2024  CONCLUSION:   Small left basilar opacity, which is favored to reflect atelectasis/scarring.  Mildly enlarged cardiomediastinal silhouette.    Findings concerning for a mild age-indeterminate compression fracture deformity at T5.  Consider further evaluation with thoracic spine radiographs.   Dictated by (CST): Tesfaye Desai MD on 5/31/2024 at 1:02 PM     Finalized by (CST): Tesfaye Desai MD on 5/31/2024 at 1:11 PM          CT ABDOMEN+PELVIS KIDNEYSTONE 2D RNDR(NO IV,NO ORAL)(CPT=74176)    Result Date: 5/31/2024  CONCLUSION:   1. Indistinct hypodense lesions in hepatic segment 4, which had simple cyst appearance on prior CTs.  Recommend nonemergent MRI of the abdomen with and without contrast for further evaluation.  2. The urinary bladder is not well evaluated due to artifact from the hip prostheses.  No hydronephrosis.  3. Cholelithiasis or sludge.  Subacute cholecystitis.  4. Additional chronic or incidental findings are described in the body of this report.    Preliminary report was given by [facility].  There are no clinically significant discrepancies.  Dictated by (CST): Maged Gonzalez MD on 5/31/2024 at 12:35 PM     Finalized by (CST): Maged Gonzalez MD on 5/31/2024 at 1:01 PM          CT BRAIN OR HEAD (37473)    Result Date: 5/31/2024  CONCLUSION:   No acute intracranial abnormality.  No calvarial fracture.  Mild involutional and moderate chronic microvascular ischemic changes.     Preliminary report was given by [facility].  There are  no clinically significant discrepancies.    Dictated by (CST): Maged Gonzalez MD on 5/31/2024 at 10:45 AM     Finalized by (CST): Maged Gonzalez MD on 5/31/2024 at 10:48 AM                   Meds:   Current Facility-Administered Medications   Medication Dose Route Frequency    atorvastatin (Lipitor) tab 20 mg  20 mg Oral Daily    vancomycin (Vancocin) cap 125 mg  125 mg Oral Daily    venlafaxine ER (Effexor-XR) 24 hr cap 37.5 mg  37.5 mg Oral Daily    QUEtiapine (SEROquel) tab 25 mg  25 mg Oral Nightly    midodrine (ProAmatine) tab 5 mg  5 mg Oral BID AC    levothyroxine (Synthroid) tab 88 mcg  88 mcg Oral Before breakfast    fludrocortisone (Florinef) tab 0.1 mg  0.1 mg Oral Daily    acetaminophen (Tylenol) tab 650 mg  650 mg Oral Q6H PRN    heparin (Porcine) 5000 UNIT/ML injection 5,000 Units  5,000 Units Subcutaneous 2 times per day    cefTRIAXone (Rocephin) 1 g in D5W 100 mL IVPB-ADD  1 g Intravenous Q24H    benzocaine-menthol (Cepacol) lozenge 1 lozenge  1 lozenge Oral PRN    lidocaine-menthol 4-1 % patch 1 patch  1 patch Transdermal Daily       Assessment & Plan    Acute cystitis without hematuria  UA +WBCs  Urine cx neg thus far  Blood cx NGTD  Lactic acid normal  Initial WBC 15.4 > 7.5  Continue rocephin    Mild anemia  -baseline Hgb in the 12's  -Hgb 13.3 on admission, now 11.9 -- suspect due to hemodilution (pt was dry on admission)  -cont to follow     Cough  CXR with small left basilar opacity c/w atelectasis/scarring  Lungs clear on exam     Altered mental status, unspecified altered mental status type  -suspect 2/2 infection, dehydration  Head CT negative  -appears lucid this am     Ankle pain  Xray neg for fracture; +soft tissue swelling     ALMA DELIA on CKD stage 3  Baseline Cr 0.8; on admission 1.12  Creat normalized 1.12 > 0.81 with IVF's  Stop IVF's     Orthostatic hypotension  Likely a cause of her dizziness.    - 2D echo: unremarkable  - Ongoing conservative measures at home  - C/w home fludrocortisone  and Midodrine increased to 5 mg twice a day; should watch bps closely     Fatigue  Seems to be a chronic issue.  Likely multifactorial in the setting of aging, urinary incontinence leading to nocturia up to 4 times a night, poor p.o. intake.  - Chronic ongoing issue, will monitor with further resolution of UTI  - Liothyronine added for hypothyroidism     Urinary incontinence  Has been requiring the use of pads, most prominent at nighttime.  - Stop oxybutinin as not helping at this time     Neck pain  Related to a fall due to dizziness about 1 month ago.  Still some soreness in the occipital area, intermittent.  Has improved however  -Cervical x-ray did show spondylosis most prominent on at C4-C5, C5-C6, C6-C7  - Check CT neck today  - Trial of alternating Tylenol, ibuprofen.  Remained stable on current regimen.  Otherwise remained stable     Joint stiffness  Localized to both hands, ongoing for quite some time.  Suspect osteoarthritis as no evidence of tenosynovitis on my examination  - Check x-ray of the bilateral hands: Prior x-rays 5/2022 did demonstrate osteoarthritis  - BATSHEVA was 1: 80, though overall low suspicion for rheumatological disease  - Can perform home exercises in addition to continuing with Tylenol on an as-needed basis for symptom support     CKD stage IIIa  Stable, creatinine 0.88/eGFR 65 -> 0.84/eGFR 69  - We will monitor      Coronary artery disease  Hx of Calcium score > 400, follows with Dr. Jimenez, last seen 2/24/2022  -We will need to establish with a new cardiologist given Dr. Jimenez's recent half-way  - Seems to be stable     Hypothyroidism  - Repeat TFTs to monitor stability at next follow-up visit  - Levothyroxine 88 mcg  - Liothyronine added 5 mcg     Chronic L1 compression fracture  - On home gabapentin 100 mg TID PRN  - Stable on current regimen     Depression  -Effexor 37.5 mg once a day.       Active tobacco use  - Evidence of emphysema on CT calcium score 2/24/2022  -Discussed the need  to stop smoking altogether, aware of the risk for lung cancer, COPD  -Recommended gradual weaning off of cigarettes by decreasing 1-2 cigarettes every 1-2 weeks.  -We also discussed nicotine replacement therapy  - Currently smoking about 7 cigarettes/day     Dispo  -PT/OT to deanna KEITH assisting with referrals to [facility] (pt prefers [facility])        Eduarda Bajwa MD  6/1/2024  11:46 AM

## 2024-06-01 NOTE — PLAN OF CARE
Problem: Patient Centered Care  Goal: Patient preferences are identified and integrated in the patient's plan of care  Description: Interventions  - What would you like us to know as we care for you? Pt lives at [facility]  - Provide timely, complete, and accurate information to patient/family  - Incorporate patient and family knowledge, values, beliefs, and cultural backgrounds into the planning and delivery of care  - Encourage patient/family to participate in care and decision-making at the level they choose  - Honor patient and family perspectives and choices  Outcome: Progressing     Problem: Patient/Family Goals  Goal: Patient/Family Long Term Goal  Description: Patient's Long Term Goal:     Interventions:    - See additional Care Plan goals for specific interventions  Outcome: Progressing  Goal: Patient/Family Short Term Goal  Description: Patient's Short Term Goal: Return home    Interventions:     - See additional Care Plan goals for specific interventions  Outcome: Progressing     Problem: PAIN - ADULT  Goal: Verbalizes/displays adequate comfort level or patient's stated pain goal  Description: INTERVENTIONS:  - Encourage pt to monitor pain and request assistance  - Assess pain using appropriate pain scale  - Administer analgesics based on type and severity of pain and evaluate response  - Implement non-pharmacological measures as appropriate and evaluate response  - Consider cultural and social influences on pain and pain management  - Manage/alleviate anxiety  - Utilize distraction and/or relaxation techniques  - Monitor for opioid side effects  - Notify MD/LIP if interventions unsuccessful or patient reports new pain  - Anticipate increased pain with activity and pre-medicate as appropriate  Outcome: Progressing     Problem: SAFETY ADULT - FALL  Goal: Free from fall injury  Description: INTERVENTIONS:  - Assess pt frequently for physical needs  - Identify cognitive and physical deficits and  behaviors that affect risk of falls.  - [place] fall precautions as indicated by assessment.  - Educate pt/family on patient safety including physical limitations  - Instruct pt to call for assistance with activity based on assessment  - Modify environment to reduce risk of injury  - Provide assistive devices as appropriate  - Consider OT/PT consult to assist with strengthening/mobility  - Encourage toileting schedule  Outcome: Progressing     Problem: DISCHARGE PLANNING  Goal: Discharge to home or other facility with appropriate resources  Description: INTERVENTIONS:  - Identify barriers to discharge w/pt and caregiver  - Include patient/family/discharge partner in discharge planning  - Arrange for needed discharge resources and transportation as appropriate  - Identify discharge learning needs (meds, wound care, etc)  - Arrange for interpreters to assist at discharge as needed  - Consider post-discharge preferences of patient/family/discharge partner  - Complete POLST form as appropriate  - Assess patient's ability to be responsible for managing their own health  - Refer to Case Management Department for coordinating discharge planning if the patient needs post-hospital services based on physician/LIP order or complex needs related to functional status, cognitive ability or social support system  Outcome: Progressing     Problem: CARDIOVASCULAR - ADULT  Goal: Maintains optimal cardiac output and hemodynamic stability  Description: INTERVENTIONS:  - Monitor vital signs, rhythm, and trends  - Monitor for bleeding, hypotension and signs of decreased cardiac output  - Evaluate effectiveness of vasoactive medications to optimize hemodynamic stability  - Monitor arterial and/or venous puncture sites for bleeding and/or hematoma  - Assess quality of pulses, skin color and temperature  - Assess for signs of decreased coronary artery perfusion - ex. Angina  - Evaluate fluid balance, assess for edema, trend  weights  Outcome: Progressing  Goal: Absence of cardiac arrhythmias or at baseline  Description: INTERVENTIONS:  - Continuous cardiac monitoring, monitor vital signs, obtain 12 lead EKG if indicated  - Evaluate effectiveness of antiarrhythmic and heart rate control medications as ordered  - Initiate emergency measures for life threatening arrhythmias  - Monitor electrolytes and administer replacement therapy as ordered  Outcome: Progressing     Problem: METABOLIC/FLUID AND ELECTROLYTES - ADULT  Goal: Glucose maintained within prescribed range  Description: INTERVENTIONS:  - Monitor Blood Glucose as ordered  - Assess for signs and symptoms of hyperglycemia and hypoglycemia  - Administer ordered medications to maintain glucose within target range  - Assess barriers to adequate nutritional intake and initiate nutrition consult as needed  - Instruct patient on self management of diabetes  Outcome: Progressing  Goal: Electrolytes maintained within normal limits  Description: INTERVENTIONS:  - Monitor labs and rhythm and assess patient for signs and symptoms of electrolyte imbalances  - Administer electrolyte replacement as ordered  - Monitor response to electrolyte replacements, including rhythm and repeat lab results as appropriate  - Fluid restriction as ordered  - Instruct patient on fluid and nutrition restrictions as appropriate  Outcome: Progressing  Goal: Hemodynamic stability and optimal renal function maintained  Description: INTERVENTIONS:  - Monitor labs and assess for signs and symptoms of volume excess or deficit  - Monitor intake, output and patient weight  - Monitor urine specific gravity, serum osmolarity and serum sodium as indicated or ordered  - Monitor response to interventions for patient's volume status, including labs, urine output, blood pressure (other measures as available)  - Encourage oral intake as appropriate  - Instruct patient on fluid and nutrition restrictions as appropriate  Outcome:  Progressing

## 2024-06-01 NOTE — CM/SW NOTE
CM left VM with patient's grandson Mark, requesting call back to f/u regarding [facility] list/choice.    Per EMR, preferred [facility].  Referral was sent to this facility, but message received that their is an outstanding balance from patient's stay in March.  Per BTE liaison Samantha, they are able to accept pending payment agreement.  CM to f/u with lillian Flores pending call back.    0152 CM received call back from lillian Flores.  Mark confirms that the pt/family preferred [facility] is [facility].  CM notified Mark of outstanding balance from patient's previous stay.  Mark requests that CM notify the BTE liaison to reach out to him regarding payment options/discuss the bill.    CM reserved [facility] in [place].  CM messaged [facility] liaison via [place], requesting a call to Mark [phone].    / to remain available for support and/or discharge planning.     Plan: DC to [facility], pending medical clearance    Arianna Wu RN, BSN  Nurse   [phone]

## 2024-06-02 LAB
ANION GAP SERPL CALC-SCNC: 7 MMOL/L (ref 0–18)
BASOPHILS # BLD AUTO: 0.05 X10(3) UL (ref 0–0.2)
BASOPHILS NFR BLD AUTO: 0.7 %
BUN BLD-MCNC: 15 MG/DL (ref 9–23)
BUN/CREAT SERPL: 19 (ref 10–20)
CALCIUM BLD-MCNC: 8.6 MG/DL (ref 8.7–10.4)
CHLORIDE SERPL-SCNC: 110 MMOL/L (ref 98–112)
CO2 SERPL-SCNC: 26 MMOL/L (ref 21–32)
CREAT BLD-MCNC: 0.79 MG/DL
DEPRECATED HBV CORE AB SER IA-ACNC: 108.6 NG/ML
DEPRECATED RDW RBC AUTO: 46.8 FL (ref 35.1–46.3)
EGFRCR SERPLBLD CKD-EPI 2021: 74 ML/MIN/1.73M2 (ref 60–?)
EOSINOPHIL # BLD AUTO: 0.33 X10(3) UL (ref 0–0.7)
EOSINOPHIL NFR BLD AUTO: 4.3 %
ERYTHROCYTE [DISTWIDTH] IN BLOOD BY AUTOMATED COUNT: 13.1 % (ref 11–15)
GLUCOSE BLD-MCNC: 98 MG/DL (ref 70–99)
HCT VFR BLD AUTO: 36.5 %
HGB BLD-MCNC: 11.8 G/DL
IMM GRANULOCYTES # BLD AUTO: 0.01 X10(3) UL (ref 0–1)
IMM GRANULOCYTES NFR BLD: 0.1 %
IRON SATN MFR SERPL: 27 %
IRON SERPL-MCNC: 57 UG/DL
LYMPHOCYTES # BLD AUTO: 3.34 X10(3) UL (ref 1–4)
LYMPHOCYTES NFR BLD AUTO: 43.7 %
MCH RBC QN AUTO: 31.8 PG (ref 26–34)
MCHC RBC AUTO-ENTMCNC: 32.3 G/DL (ref 31–37)
MCV RBC AUTO: 98.4 FL
MONOCYTES # BLD AUTO: 0.92 X10(3) UL (ref 0.1–1)
MONOCYTES NFR BLD AUTO: 12 %
NEUTROPHILS # BLD AUTO: 2.99 X10 (3) UL (ref 1.5–7.7)
NEUTROPHILS # BLD AUTO: 2.99 X10(3) UL (ref 1.5–7.7)
NEUTROPHILS NFR BLD AUTO: 39.2 %
OSMOLALITY SERPL CALC.SUM OF ELEC: 297 MOSM/KG (ref 275–295)
PLATELET # BLD AUTO: 266 10(3)UL (ref 150–450)
POTASSIUM SERPL-SCNC: 3.8 MMOL/L (ref 3.5–5.1)
RBC # BLD AUTO: 3.71 X10(6)UL
SODIUM SERPL-SCNC: 143 MMOL/L (ref 136–145)
TIBC SERPL-MCNC: 213 UG/DL (ref 250–425)
TRANSFERRIN SERPL-MCNC: 143 MG/DL (ref 250–380)
VIT B12 SERPL-MCNC: 991 PG/ML (ref 211–911)
WBC # BLD AUTO: 7.6 X10(3) UL (ref 4–11)

## 2024-06-02 PROCEDURE — 99232 SBSQ HOSP IP/OBS MODERATE 35: CPT | Performed by: INTERNAL MEDICINE

## 2024-06-02 RX ORDER — AMOXICILLIN 500 MG/1
500 CAPSULE ORAL EVERY 8 HOURS
Status: DISCONTINUED | OUTPATIENT
Start: 2024-06-02 | End: 2024-06-03

## 2024-06-02 RX ORDER — LEVOTHYROXINE SODIUM 0.07 MG/1
75 TABLET ORAL
Status: DISCONTINUED | OUTPATIENT
Start: 2024-06-03 | End: 2024-06-03

## 2024-06-02 NOTE — PROGRESS NOTES
[facility]  part of [facility]    Progress Note    [place] Patient Status:  Inpatient    1940 MRN [id number]   Location [facility] 5SW/SE Attending Eduarda Bajwa MD   Hosp Day # 2 PCP Telly Li MD       SUBJECTIVE:  Pt awake, alert.  Denies abd or ankle pain    OBJECTIVE:  Vital signs in last 24 hours:  BP (!) 166/87 (BP Location: Right arm)   Pulse 83   Temp 98.1 °F (36.7 °C) (Oral)   Resp 18   Wt 144 lb 6.4 oz (65.5 kg)   SpO2 93%   BMI 24.79 kg/m²     Intake/Output:    Intake/Output Summary (Last 24 hours) at 2024 0944  Last data filed at 2024 0941  Gross per 24 hour   Intake 875 ml   Output 1425 ml   Net -550 ml       Wt Readings from Last 3 Encounters:   24 144 lb 6.4 oz (65.5 kg)   23 143 lb 4.8 oz (65 kg)   23 143 lb 11.2 oz (65.2 kg)       EXAM:  GENERAL: well developed, well nourished, in no apparent distress  LUNGS: clear to auscultation  CARDIO: RRR, normal S1S2, without murmur or gallop  GI: soft, NT, ND, NABS  EXTREMITIES: no cyanosis, clubbing or edema (localized left lateral ankle swelling without tenderness)    Data Review:     Labs:   Lab Results   Component Value Date    WBC 7.6 2024    HGB 11.8 2024    HCT 36.5 2024    .0 2024    CREATSERUM 0.79 2024    BUN 15 2024     2024    K 3.8 2024     2024    CO2 26.0 2024    GLU 98 2024    CA 8.6 2024         Imaging:  XR ANKLE (MIN 3 VIEWS), LEFT (CPT=73610)    Result Date: 2024  CONCLUSION:   No acute fracture or dislocation.  Soft tissue swelling about the ankle.  Demineralized osseous structures.     Dictated by (CST): Tesfaye Desai MD on 2024 at 1:11 PM     Finalized by (CST): Tesfaye Desai MD on 2024 at 1:13 PM          XR CHEST PA + LAT CHEST (CPT=71046)    Result Date: 2024  CONCLUSION:   Small left basilar opacity, which is favored to reflect  atelectasis/scarring.  Mildly enlarged cardiomediastinal silhouette.    Findings concerning for a mild age-indeterminate compression fracture deformity at T5.  Consider further evaluation with thoracic spine radiographs.   Dictated by (CST): Tesfaye Desai MD on 5/31/2024 at 1:02 PM     Finalized by (CST): Tesfaye Desai MD on 5/31/2024 at 1:11 PM                   Meds:   Current Facility-Administered Medications   Medication Dose Route Frequency    [START ON 6/3/2024] levothyroxine (Synthroid) tab 75 mcg  75 mcg Oral Before breakfast    atorvastatin (Lipitor) tab 20 mg  20 mg Oral Daily    vancomycin (Vancocin) cap 125 mg  125 mg Oral Daily    venlafaxine ER (Effexor-XR) 24 hr cap 37.5 mg  37.5 mg Oral Daily    QUEtiapine (SEROquel) tab 25 mg  25 mg Oral Nightly    fludrocortisone (Florinef) tab 0.1 mg  0.1 mg Oral Daily    acetaminophen (Tylenol) tab 650 mg  650 mg Oral Q6H PRN    heparin (Porcine) 5000 UNIT/ML injection 5,000 Units  5,000 Units Subcutaneous 2 times per day    cefTRIAXone (Rocephin) 1 g in D5W 100 mL IVPB-ADD  1 g Intravenous Q24H    benzocaine-menthol (Cepacol) lozenge 1 lozenge  1 lozenge Oral PRN    lidocaine-menthol 4-1 % patch 1 patch  1 patch Transdermal Daily       Assessment & Plan    Acute cystitis without hematuria  UA +WBCs  Urine cx --insufficient growth -- still suspect UTI given significant pyuria  Blood cx NGTD  Lactic acid normal  Initial WBC 15.4 > normalized  -day 3 ceftriaxone -- stop today    Generalized weakness  -pt was apparently ambulatory at home w/a walker  -currently max assist with PT  -cont PT/OT  -will need [facility]     Mild anemia  -baseline Hgb in the 12's  -Hgb 13.3 on admission, then 11.9 >11.8 -- suspect due to hemodilution (pt was dry on admission)  -check B12 (normal in 8/2023), iron studies    Orthostatic hypotension  -on fludrocortisone and Midodrine 5mg BID (increased from 2.5mg BID in 8/2023)  -BP has been running high in past 2 days (140's-170's systolic) so  midodrine not given  -will stop midodrine for now  -hold fludrocortisone for SBP>140  -check orthostatic BP once pt able to get up.     Cough  CXR with small left basilar opacity c/w atelectasis/scarring  Lungs clear on exam  No cough noted this am     Altered mental status, unspecified altered mental status type  -suspect 2/2 infection, dehydration  Head CT negative  -more awake, alert today     Ankle pain, left  Xray 5/31/24 neg for fracture; +soft tissue swelling  Pt denies pain today.  No tenderness on exam     ALMA DELIA on CKD stage 3a  Baseline Cr 0.8; on admission 1.12  Creat normalized 1.12 > 0.81 with IVF's (now stopped)     Fatigue  -chronic issue.  Likely multifactorial in the setting of aging, urinary incontinence leading to nocturia up to 4 times a night, poor p.o. intake.     Urinary incontinence  Has been requiring the use of pads, most prominent at nighttime.  - Stopped oxybutinin as was not helping     Joint stiffness  Localized to both hands, ongoing for quite some time.  Suspect osteoarthritis as no evidence of tenosynovitis on my examination  - Check x-ray of the bilateral hands: Prior x-rays 5/2022 did demonstrate osteoarthritis  - BATSHEAV was 1: 80, though overall low suspicion for rheumatological disease  - Can perform home exercises in addition to continuing with Tylenol on an as-needed basis for symptom support     Coronary artery disease  Hx of Calcium score > 400, followed with Dr. Jimenez, last seen 2/24/2022  -Dr. Jimenez retired; pt has not yet established with new cardiologist  -on lipitor  -pt has declined aspirin therapy     Hypothyroidism  - no longer on liothyronine  - Levothyroxine 88 mcg  -TSH 1.54 >0.384  - decrease levothyroxine to 75mcg/day  - repeat TSH in 3 mos (9/2024)     Chronic L1 compression fracture  - On home gabapentin 100 mg TID PRN  - Stable on current regimen     Depression  -Effexor 37.5 mg once a day.       Active tobacco use  - Evidence of emphysema on CT calcium score  2/24/2022  -Discussed the need to stop smoking altogether, aware of the risk for lung cancer, COPD  -Recommended gradual weaning off of cigarettes by decreasing 1-2 cigarettes every 1-2 weeks.  -We also discussed nicotine replacement therapy  - Currently smoking about 7 cigarettes/day                Dispo  -PT/OT to continue  -SW assisting with referrals to [facility] (pt prefers [facility])         D/w RN.  Left message on VM of Mark snyder (RN)          Eduarda Bajwa MD  6/2/2024  9:44 AM

## 2024-06-02 NOTE — PLAN OF CARE
Pt alert and oriented. Forgetful. Vitals stable. Notified Dr. Bajwa regarding elevated bp. Midodrine dc'd, parameters added for Edm[place]  Goal: Patient preferences are identified and integrated in the patient's plan of care  Description: Interventions:  - What would you like us to know as we care for you?   - Provide timely, complete, and accurate information to patient/family  - Incorporate patient and family knowledge, values, beliefs, and cultural backgrounds into the planning and delivery of care  - Encourage patient/family to participate in care and decision-making at the level they choose  - Honor patient and family perspectives and choices  Outcome: Progressing     Problem: Patient/Family Goals  Goal: Patient/Family Long Term Goal  Description: Patient's Long Term Goal:     Interventions:  -   - See additional Care Plan goals for specific interventions  Outcome: Progressing  Goal: Patient/Family Short Term Goal  Description: Patient's Short Term Goal:     Interventions:   -   - See additional Care Plan goals for specific interventions  Outcome: Progressing     Problem: PAIN - ADULT  Goal: Verbalizes/displays adequate comfort level or patient's stated pain goal  Description: INTERVENTIONS:  - Encourage pt to monitor pain and request assistance  - Assess pain using appropriate pain scale  - Administer analgesics based on type and severity of pain and evaluate response  - Implement non-pharmacological measures as appropriate and evaluate response  - Consider cultural and social influences on pain and pain management  - Manage/alleviate anxiety  - Utilize distraction and/or relaxation techniques  - Monitor for opioid side effects  - Notify MD/LIP if interventions unsuccessful or patient reports new pain  - Anticipate increased pain with activity and pre-medicate as appropriate  Outcome: Progressing     Problem: SAFETY ADULT - FALL  Goal: Free from fall injury  Description: INTERVENTIONS:  - Assess pt  frequently for physical needs  - Identify cognitive and physical deficits and behaviors that affect risk of falls.  - [place] fall precautions as indicated by assessment.  - Educate pt/family on patient safety including physical limitations  - Instruct pt to call for assistance with activity based on assessment  - Modify environment to reduce risk of injury  - Provide assistive devices as appropriate  - Consider OT/PT consult to assist with strengthening/mobility  - Encourage toileting schedule  Outcome: Progressing     Problem: DISCHARGE PLANNING  Goal: Discharge to home or other facility with appropriate resources  Description: INTERVENTIONS:  - Identify barriers to discharge w/pt and caregiver  - Include patient/family/discharge partner in discharge planning  - Arrange for needed discharge resources and transportation as appropriate  - Identify discharge learning needs (meds, wound care, etc)  - Arrange for interpreters to assist at discharge as needed  - Consider post-discharge preferences of patient/family/discharge partner  - Complete POLST form as appropriate  - Assess patient's ability to be responsible for managing their own health  - Refer to Case Management Department for coordinating discharge planning if the patient needs post-hospital services based on physician/LIP order or complex needs related to functional status, cognitive ability or social support system  Outcome: Progressing     Problem: CARDIOVASCULAR - ADULT  Goal: Maintains optimal cardiac output and hemodynamic stability  Description: INTERVENTIONS:  - Monitor vital signs, rhythm, and trends  - Monitor for bleeding, hypotension and signs of decreased cardiac output  - Evaluate effectiveness of vasoactive medications to optimize hemodynamic stability  - Monitor arterial and/or venous puncture sites for bleeding and/or hematoma  - Assess quality of pulses, skin color and temperature  - Assess for signs of decreased coronary artery perfusion  - ex. Angina  - Evaluate fluid balance, assess for edema, trend weights  Outcome: Progressing  Goal: Absence of cardiac arrhythmias or at baseline  Description: INTERVENTIONS:  - Continuous cardiac monitoring, monitor vital signs, obtain 12 lead EKG if indicated  - Evaluate effectiveness of antiarrhythmic and heart rate control medications as ordered  - Initiate emergency measures for life threatening arrhythmias  - Monitor electrolytes and administer replacement therapy as ordered  Outcome: Progressing     Problem: METABOLIC/FLUID AND ELECTROLYTES - ADULT  Goal: Glucose maintained within prescribed range  Description: INTERVENTIONS:  - Monitor Blood Glucose as ordered  - Assess for signs and symptoms of hyperglycemia and hypoglycemia  - Administer ordered medications to maintain glucose within target range  - Assess barriers to adequate nutritional intake and initiate nutrition consult as needed  - Instruct patient on self management of diabetes  Outcome: Progressing  Goal: Electrolytes maintained within normal limits  Description: INTERVENTIONS:  - Monitor labs and rhythm and assess patient for signs and symptoms of electrolyte imbalances  - Administer electrolyte replacement as ordered  - Monitor response to electrolyte replacements, including rhythm and repeat lab results as appropriate  - Fluid restriction as ordered  - Instruct patient on fluid and nutrition restrictions as appropriate  Outcome: Progressing  Goal: Hemodynamic stability and optimal renal function maintained  Description: INTERVENTIONS:  - Monitor labs and assess for signs and symptoms of volume excess or deficit  - Monitor intake, output and patient weight  - Monitor urine specific gravity, serum osmolarity and serum sodium as indicated or ordered  - Monitor response to interventions for patient's volume status, including labs, urine output, blood pressure (other measures as available)  - Encourage oral intake as appropriate  - Instruct patient  on fluid and nutrition restrictions as appropriate  Outcome: Progressing

## 2024-06-02 NOTE — PLAN OF CARE
Pt lethargic at beginning of shift. Now more alert, oriented. Can be forgetful. Appetite good. No complaints of pain. Vitals stable.   Problem: Patient Centered Care  Goal: Patient preferences are identified and integrated in the patient's plan of care  Description: Interventions:  - What would you like us to know as we care for you?   - Provide timely, complete, and accurate information to patient/family  - Incorporate patient and family knowledge, values, beliefs, and cultural backgrounds into the planning and delivery of care  - Encourage patient/family to participate in care and decision-making at the level they choose  - Honor patient and family perspectives and choices  Outcome: Progressing     Problem: Patient/Family Goals  Goal: Patient/Family Long Term Goal  Description: Patient's Long Term Goal:     Interventions:  -   - See additional Care Plan goals for specific interventions  Outcome: Progressing  Goal: Patient/Family Short Term Goal  Description: Patient's Short Term Goal:     Interventions:   -   - See additional Care Plan goals for specific interventions  Outcome: Progressing     Problem: PAIN - ADULT  Goal: Verbalizes/displays adequate comfort level or patient's stated pain goal  Description: INTERVENTIONS:  - Encourage pt to monitor pain and request assistance  - Assess pain using appropriate pain scale  - Administer analgesics based on type and severity of pain and evaluate response  - Implement non-pharmacological measures as appropriate and evaluate response  - Consider cultural and social influences on pain and pain management  - Manage/alleviate anxiety  - Utilize distraction and/or relaxation techniques  - Monitor for opioid side effects  - Notify MD/LIP if interventions unsuccessful or patient reports new pain  - Anticipate increased pain with activity and pre-medicate as appropriate  Outcome: Progressing     Problem: SAFETY ADULT - FALL  Goal: Free from fall injury  Description:  INTERVENTIONS:  - Assess pt frequently for physical needs  - Identify cognitive and physical deficits and behaviors that affect risk of falls.  - [place] fall precautions as indicated by assessment.  - Educate pt/family on patient safety including physical limitations  - Instruct pt to call for assistance with activity based on assessment  - Modify environment to reduce risk of injury  - Provide assistive devices as appropriate  - Consider OT/PT consult to assist with strengthening/mobility  - Encourage toileting schedule  Outcome: Progressing     Problem: DISCHARGE PLANNING  Goal: Discharge to home or other facility with appropriate resources  Description: INTERVENTIONS:  - Identify barriers to discharge w/pt and caregiver  - Include patient/family/discharge partner in discharge planning  - Arrange for needed discharge resources and transportation as appropriate  - Identify discharge learning needs (meds, wound care, etc)  - Arrange for interpreters to assist at discharge as needed  - Consider post-discharge preferences of patient/family/discharge partner  - Complete POLST form as appropriate  - Assess patient's ability to be responsible for managing their own health  - Refer to Case Management Department for coordinating discharge planning if the patient needs post-hospital services based on physician/LIP order or complex needs related to functional status, cognitive ability or social support system  Outcome: Progressing     Problem: CARDIOVASCULAR - ADULT  Goal: Maintains optimal cardiac output and hemodynamic stability  Description: INTERVENTIONS:  - Monitor vital signs, rhythm, and trends  - Monitor for bleeding, hypotension and signs of decreased cardiac output  - Evaluate effectiveness of vasoactive medications to optimize hemodynamic stability  - Monitor arterial and/or venous puncture sites for bleeding and/or hematoma  - Assess quality of pulses, skin color and temperature  - Assess for signs of  decreased coronary artery perfusion - ex. Angina  - Evaluate fluid balance, assess for edema, trend weights  Outcome: Progressing  Goal: Absence of cardiac arrhythmias or at baseline  Description: INTERVENTIONS:  - Continuous cardiac monitoring, monitor vital signs, obtain 12 lead EKG if indicated  - Evaluate effectiveness of antiarrhythmic and heart rate control medications as ordered  - Initiate emergency measures for life threatening arrhythmias  - Monitor electrolytes and administer replacement therapy as ordered  Outcome: Progressing     Problem: METABOLIC/FLUID AND ELECTROLYTES - ADULT  Goal: Glucose maintained within prescribed range  Description: INTERVENTIONS:  - Monitor Blood Glucose as ordered  - Assess for signs and symptoms of hyperglycemia and hypoglycemia  - Administer ordered medications to maintain glucose within target range  - Assess barriers to adequate nutritional intake and initiate nutrition consult as needed  - Instruct patient on self management of diabetes  Outcome: Progressing  Goal: Electrolytes maintained within normal limits  Description: INTERVENTIONS:  - Monitor labs and rhythm and assess patient for signs and symptoms of electrolyte imbalances  - Administer electrolyte replacement as ordered  - Monitor response to electrolyte replacements, including rhythm and repeat lab results as appropriate  - Fluid restriction as ordered  - Instruct patient on fluid and nutrition restrictions as appropriate  Outcome: Progressing  Goal: Hemodynamic stability and optimal renal function maintained  Description: INTERVENTIONS:  - Monitor labs and assess for signs and symptoms of volume excess or deficit  - Monitor intake, output and patient weight  - Monitor urine specific gravity, serum osmolarity and serum sodium as indicated or ordered  - Monitor response to interventions for patient's volume status, including labs, urine output, blood pressure (other measures as available)  - Encourage oral  intake as appropriate  - Instruct patient on fluid and nutrition restrictions as appropriate  Outcome: Progressing

## 2024-06-02 NOTE — PLAN OF CARE
Problem: Patient Centered Care  Goal: Patient preferences are identified and integrated in the patient's plan of care  Description: Interventions:  - What would you like us to know as we care for you?   - Provide timely, complete, and accurate information to patient/family  - Incorporate patient and family knowledge, values, beliefs, and cultural backgrounds into the planning and delivery of care  - Encourage patient/family to participate in care and decision-making at the level they choose  - Honor patient and family perspectives and choices  Outcome: Progressing     Problem: Patient/Family Goals  Goal: Patient/Family Long Term Goal  Description: Patient's Long Term Goal:     Interventions:  -   - See additional Care Plan goals for specific interventions  Outcome: Progressing  Goal: Patient/Family Short Term Goal  Description: Patient's Short Term Goal:     Interventions:   -   - See additional Care Plan goals for specific interventions  Outcome: Progressing     Problem: PAIN - ADULT  Goal: Verbalizes/displays adequate comfort level or patient's stated pain goal  Description: INTERVENTIONS:  - Encourage pt to monitor pain and request assistance  - Assess pain using appropriate pain scale  - Administer analgesics based on type and severity of pain and evaluate response  - Implement non-pharmacological measures as appropriate and evaluate response  - Consider cultural and social influences on pain and pain management  - Manage/alleviate anxiety  - Utilize distraction and/or relaxation techniques  - Monitor for opioid side effects  - Notify MD/LIP if interventions unsuccessful or patient reports new pain  - Anticipate increased pain with activity and pre-medicate as appropriate  Outcome: Progressing     Problem: SAFETY ADULT - FALL  Goal: Free from fall injury  Description: INTERVENTIONS:  - Assess pt frequently for physical needs  - Identify cognitive and physical deficits and behaviors that affect risk of  falls.  - [place] fall precautions as indicated by assessment.  - Educate pt/family on patient safety including physical limitations  - Instruct pt to call for assistance with activity based on assessment  - Modify environment to reduce risk of injury  - Provide assistive devices as appropriate  - Consider OT/PT consult to assist with strengthening/mobility  - Encourage toileting schedule  Outcome: Progressing     Problem: DISCHARGE PLANNING  Goal: Discharge to home or other facility with appropriate resources  Description: INTERVENTIONS:  - Identify barriers to discharge w/pt and caregiver  - Include patient/family/discharge partner in discharge planning  - Arrange for needed discharge resources and transportation as appropriate  - Identify discharge learning needs (meds, wound care, etc)  - Arrange for interpreters to assist at discharge as needed  - Consider post-discharge preferences of patient/family/discharge partner  - Complete POLST form as appropriate  - Assess patient's ability to be responsible for managing their own health  - Refer to Case Management Department for coordinating discharge planning if the patient needs post-hospital services based on physician/LIP order or complex needs related to functional status, cognitive ability or social support system  Outcome: Progressing     Problem: CARDIOVASCULAR - ADULT  Goal: Maintains optimal cardiac output and hemodynamic stability  Description: INTERVENTIONS:  - Monitor vital signs, rhythm, and trends  - Monitor for bleeding, hypotension and signs of decreased cardiac output  - Evaluate effectiveness of vasoactive medications to optimize hemodynamic stability  - Monitor arterial and/or venous puncture sites for bleeding and/or hematoma  - Assess quality of pulses, skin color and temperature  - Assess for signs of decreased coronary artery perfusion - ex. Angina  - Evaluate fluid balance, assess for edema, trend weights  Outcome: Progressing  Goal: Absence  of cardiac arrhythmias or at baseline  Description: INTERVENTIONS:  - Continuous cardiac monitoring, monitor vital signs, obtain 12 lead EKG if indicated  - Evaluate effectiveness of antiarrhythmic and heart rate control medications as ordered  - Initiate emergency measures for life threatening arrhythmias  - Monitor electrolytes and administer replacement therapy as ordered  Outcome: Progressing     Problem: METABOLIC/FLUID AND ELECTROLYTES - ADULT  Goal: Glucose maintained within prescribed range  Description: INTERVENTIONS:  - Monitor Blood Glucose as ordered  - Assess for signs and symptoms of hyperglycemia and hypoglycemia  - Administer ordered medications to maintain glucose within target range  - Assess barriers to adequate nutritional intake and initiate nutrition consult as needed  - Instruct patient on self management of diabetes  Outcome: Progressing  Goal: Electrolytes maintained within normal limits  Description: INTERVENTIONS:  - Monitor labs and rhythm and assess patient for signs and symptoms of electrolyte imbalances  - Administer electrolyte replacement as ordered  - Monitor response to electrolyte replacements, including rhythm and repeat lab results as appropriate  - Fluid restriction as ordered  - Instruct patient on fluid and nutrition restrictions as appropriate  Outcome: Progressing  Goal: Hemodynamic stability and optimal renal function maintained  Description: INTERVENTIONS:  - Monitor labs and assess for signs and symptoms of volume excess or deficit  - Monitor intake, output and patient weight  - Monitor urine specific gravity, serum osmolarity and serum sodium as indicated or ordered  - Monitor response to interventions for patient's volume status, including labs, urine output, blood pressure (other measures as available)  - Encourage oral intake as appropriate  - Instruct patient on fluid and nutrition restrictions as appropriate  Outcome: Progressing     Sugar is aware of her plan of  care. Patient is alert and oriented x4, can be forgetful at times. Room air. Denies any pain. Tolerating diet. Voids via purewick, output WNL. Patient had large BM overnight. Max assist. Safety measures in place, call light within reach, will continue to monitor.

## 2024-06-03 VITALS
DIASTOLIC BLOOD PRESSURE: 77 MMHG | WEIGHT: 144.38 LBS | BODY MASS INDEX: 25 KG/M2 | HEART RATE: 74 BPM | RESPIRATION RATE: 18 BRPM | OXYGEN SATURATION: 94 % | TEMPERATURE: 98 F | SYSTOLIC BLOOD PRESSURE: 140 MMHG

## 2024-06-03 LAB
ANION GAP SERPL CALC-SCNC: 5 MMOL/L (ref 0–18)
BASOPHILS # BLD AUTO: 0.06 X10(3) UL (ref 0–0.2)
BASOPHILS NFR BLD AUTO: 0.7 %
BUN BLD-MCNC: 17 MG/DL (ref 9–23)
BUN/CREAT SERPL: 21.5 (ref 10–20)
CALCIUM BLD-MCNC: 8.7 MG/DL (ref 8.7–10.4)
CHLORIDE SERPL-SCNC: 111 MMOL/L (ref 98–112)
CO2 SERPL-SCNC: 27 MMOL/L (ref 21–32)
CREAT BLD-MCNC: 0.79 MG/DL
DEPRECATED RDW RBC AUTO: 49.3 FL (ref 35.1–46.3)
EGFRCR SERPLBLD CKD-EPI 2021: 74 ML/MIN/1.73M2 (ref 60–?)
EOSINOPHIL # BLD AUTO: 0.34 X10(3) UL (ref 0–0.7)
EOSINOPHIL NFR BLD AUTO: 3.8 %
ERYTHROCYTE [DISTWIDTH] IN BLOOD BY AUTOMATED COUNT: 13.2 % (ref 11–15)
GLUCOSE BLD-MCNC: 107 MG/DL (ref 70–99)
HCT VFR BLD AUTO: 37.8 %
HGB BLD-MCNC: 12 G/DL
IMM GRANULOCYTES # BLD AUTO: 0.02 X10(3) UL (ref 0–1)
IMM GRANULOCYTES NFR BLD: 0.2 %
LYMPHOCYTES # BLD AUTO: 3.46 X10(3) UL (ref 1–4)
LYMPHOCYTES NFR BLD AUTO: 38.4 %
MCH RBC QN AUTO: 31.8 PG (ref 26–34)
MCHC RBC AUTO-ENTMCNC: 31.7 G/DL (ref 31–37)
MCV RBC AUTO: 100.3 FL
MONOCYTES # BLD AUTO: 1.13 X10(3) UL (ref 0.1–1)
MONOCYTES NFR BLD AUTO: 12.5 %
NEUTROPHILS # BLD AUTO: 4 X10 (3) UL (ref 1.5–7.7)
NEUTROPHILS # BLD AUTO: 4 X10(3) UL (ref 1.5–7.7)
NEUTROPHILS NFR BLD AUTO: 44.4 %
OSMOLALITY SERPL CALC.SUM OF ELEC: 298 MOSM/KG (ref 275–295)
PLATELET # BLD AUTO: 274 10(3)UL (ref 150–450)
POTASSIUM SERPL-SCNC: 3.7 MMOL/L (ref 3.5–5.1)
RBC # BLD AUTO: 3.77 X10(6)UL
SODIUM SERPL-SCNC: 143 MMOL/L (ref 136–145)
WBC # BLD AUTO: 9 X10(3) UL (ref 4–11)

## 2024-06-03 PROCEDURE — 99239 HOSP IP/OBS DSCHRG MGMT >30: CPT | Performed by: INTERNAL MEDICINE

## 2024-06-03 RX ORDER — AMOXICILLIN 500 MG/1
500 CAPSULE ORAL 3 TIMES DAILY
Qty: 15 CAPSULE | Refills: 0 | Status: SHIPPED | OUTPATIENT
Start: 2024-06-03 | End: 2024-06-08

## 2024-06-03 NOTE — PROGRESS NOTES
[facility]  part of [facility]    Progress Note    [place] Patient Status:  Inpatient    1940 MRN [id number]   Location [facility] 5SW/SE Attending Eduarda Bajwa MD   Hosp Day # 3 PCP Telly Li MD       SUBJECTIVE:  Feeling better today.  No pain right now.    OBJECTIVE:  Vital signs in last 24 hours:  /60 (BP Location: Right arm)   Pulse 79   Temp 98.1 °F (36.7 °C) (Oral)   Resp 16   Wt 144 lb 6.4 oz (65.5 kg)   SpO2 92%   BMI 24.79 kg/m²     Intake/Output:    Intake/Output Summary (Last 24 hours) at 6/3/2024 0723  Last data filed at 6/3/2024 0656  Gross per 24 hour   Intake 395 ml   Output 1100 ml   Net -705 ml       Wt Readings from Last 3 Encounters:   24 144 lb 6.4 oz (65.5 kg)   23 143 lb 4.8 oz (65 kg)   23 143 lb 11.2 oz (65.2 kg)       EXAM:  GENERAL: well developed, well nourished, in no apparent distress  LUNGS: clear to auscultation  CARDIO: RRR, normal S1S2, without murmur or gallop  GI: soft, NT, ND, NABS  EXTREMITIES: no cyanosis, clubbing or edema (localized left lateral ankle swelling without tenderness)    Data Review:     Labs:   Lab Results   Component Value Date    WBC 9.0 2024    HGB 12.0 2024    HCT 37.8 2024    .0 2024    CREATSERUM 0.79 2024    BUN 17 2024     2024    K 3.7 2024     2024    CO2 27.0 2024     2024    CA 8.7 2024    B12 991 2024         Imaging:  No results found.            Meds:   Current Facility-Administered Medications   Medication Dose Route Frequency    levothyroxine (Synthroid) tab 75 mcg  75 mcg Oral Before breakfast    amoxicillin (Amoxil) cap 500 mg  500 mg Oral Q8H    atorvastatin (Lipitor) tab 20 mg  20 mg Oral Daily    vancomycin (Vancocin) cap 125 mg  125 mg Oral Daily    venlafaxine ER (Effexor-XR) 24 hr cap 37.5 mg  37.5 mg Oral Daily    QUEtiapine (SEROquel) tab 25 mg  25 mg  Oral Nightly    fludrocortisone (Florinef) tab 0.1 mg  0.1 mg Oral Daily    acetaminophen (Tylenol) tab 650 mg  650 mg Oral Q6H PRN    heparin (Porcine) 5000 UNIT/ML injection 5,000 Units  5,000 Units Subcutaneous 2 times per day    benzocaine-menthol (Cepacol) lozenge 1 lozenge  1 lozenge Oral PRN    lidocaine-menthol 4-1 % patch 1 patch  1 patch Transdermal Daily       Assessment & Plan    Acute cystitis without hematuria  UA +WBCs  Urine cx -- 5/31 aerococus urinae  Blood cx NGTD  Lactic acid normal  Initial WBC 15.4 > normalized  -day 3 ceftriaxone -- stopped 6/2, switched to Amoxicillin for a total of 7 days Abx. EOT: 6/6/2024    Generalized weakness  -pt was apparently ambulatory at home w/a walker  -currently max assist with PT  -cont PT/OT  -will need [facility]     Mild anemia  -baseline Hgb in the 12's  -Hgb 13.3 on admission, then 11.9 >11.8 -- suspect due to hemodilution (pt was dry on admission)  -check B12 (normal in 8/2023) - close to normal range, iron studies suggestive of chronic inflammation    Orthostatic hypotension  -on fludrocortisone and Midodrine 5mg BID (increased from 2.5mg BID in 8/2023)  -BP has been running high in past 2 days (140's-170's systolic) so midodrine not given  -will stop midodrine for now  -hold fludrocortisone for SBP>140  -check orthostatic BP once pt able to get up.     Cough  CXR with small left basilar opacity c/w atelectasis/scarring  Lungs clear on exam  No cough noted this am     Altered mental status, unspecified altered mental status type  -suspect 2/2 infection, dehydration  Head CT negative  -more awake, alert today     Ankle pain, left  Xray 5/31/24 neg for fracture; +soft tissue swelling  Pt denies pain today.  No tenderness on exam     ALMA DELIA on CKD stage 3a  Baseline Cr 0.8; on admission 1.12  Creat normalized 1.12 > 0.81 with IVF's (now stopped)  - Cr today 0.79     Fatigue  -chronic issue.  Likely multifactorial in the setting of aging, urinary incontinence leading  to nocturia up to 4 times a night, poor p.o. intake.     Urinary incontinence  Has been requiring the use of pads, most prominent at nighttime.  - Stopped oxybutinin as was not helping     Joint stiffness  Localized to both hands, ongoing for quite some time.  Suspect osteoarthritis as no evidence of tenosynovitis on my examination  - Check x-ray of the bilateral hands: Prior x-rays 5/2022 did demonstrate osteoarthritis  - BATSHEVA was 1: 80, though overall low suspicion for rheumatological disease  - Can perform home exercises in addition to continuing with Tylenol on an as-needed basis for symptom support     Coronary artery disease  Hx of Calcium score > 400, followed with Dr. Jimenez, last seen 2/24/2022  -Dr. Jimenez retired; pt has not yet established with new cardiologist  -on lipitor  -pt has declined aspirin therapy     Hypothyroidism  - no longer on liothyronine  - Levothyroxine 88 mcg  -TSH 1.54 >0.384  - decrease levothyroxine to 75mcg/day  - repeat TSH in 3 mos (9/2024)     Chronic L1 compression fracture  - On home gabapentin 100 mg TID PRN  - Stable on current regimen     Depression  -Effexor 37.5 mg once a day.       Active tobacco use  - Evidence of emphysema on CT calcium score 2/24/2022  -Discussed the need to stop smoking altogether, aware of the risk for lung cancer, COPD  -Recommended gradual weaning off of cigarettes by decreasing 1-2 cigarettes every 1-2 weeks.  -We also discussed nicotine replacement therapy  - Currently smoking about 7 cigarettes/day                Dispo  -PT/OT to continue  -SW assisting with referrals to [facility] (pt prefers [facility])      VTE PPx: [facility] BID    Spoke with grandson Mark, over the phone.  Has returned to baseline mental status.  Amenable to transfer to rehab once we have insurance authorization.  Medically stable for today    Telly Li MD, 06/03/24, 9:38 AM

## 2024-06-03 NOTE — CM/SW NOTE
SW followed up on discharge planning.    Pt discussed in nursing rounds.  Pt medically stable to discharge.    BRYANT confirmed with liaison Samantha at [facility] that no outstanding balance has been settled yet- the  stated lillian Flores has not returned the facility's calls.      SW to contact Mark- this will need to be settled prior to pt discharge.    PLAN: DC to [facility] pending family settles outstanding balance    / to remain available for support and/or discharge planning.     Cyndie Smith MSW, LSW [id number]

## 2024-06-03 NOTE — CM/SW NOTE
06/03/24 1100   Discharge disposition   Expected discharge disposition subacute   Post Acute Care Provider [facility]  ([facility])   Discharge transportation [facility]     BRYANT confirmed with Dr. Li and RN Hope that pt is medically ready for discharge today.  BRYANT discussed with liaison Samantha from [facility] to arrange a time for discharge.  BRYANT scheduled [facility] for 3:00 PM transport.  RN is aware of discharge time and location and will inform patient.  BRYANT spoke to lillian Flores with discharge time/plan.  RN to attach IP Transfer Report to After Visit Summary packet for transfer to [facility].  BRYANT confirmed PASRR screen was completed.    PLAN: DC to [facility] via [facility] at 3:00 PM.  PCS completed.    RN # to report: [phone]     Cyndie Smith MSW, LSW [id number]

## 2024-06-03 NOTE — PLAN OF CARE
No acute changes. Tolerating meals. Report given to nati mendoza. Pt spoke with grandson about discharge. IV taken out. Pt discharged to [facility] in stable condition.     Problem: Patient Centered Care  Goal: Patient preferences are identified and integrated in the patient's plan of care  Description: Interventions:  - What would you like us to know as we care for you? My personal contact is my grandson  - Provide timely, complete, and accurate information to patient/family  - Incorporate patient and family knowledge, values, beliefs, and cultural backgrounds into the planning and delivery of care  - Encourage patient/family to participate in care and decision-making at the level they choose  - Honor patient and family perspectives and choices  Outcome: Completed       Problem: PAIN - ADULT  Goal: Verbalizes/displays adequate comfort level or patient's stated pain goal  Description: INTERVENTIONS:  - Encourage pt to monitor pain and request assistance  - Assess pain using appropriate pain scale  - Administer analgesics based on type and severity of pain and evaluate response  - Implement non-pharmacological measures as appropriate and evaluate response  - Consider cultural and social influences on pain and pain management  - Manage/alleviate anxiety  - Utilize distraction and/or relaxation techniques  - Monitor for opioid side effects  - Notify MD/LIP if interventions unsuccessful or patient reports new pain  - Anticipate increased pain with activity and pre-medicate as appropriate  Outcome: Completed     Problem: SAFETY ADULT - FALL  Goal: Free from fall injury  Description: INTERVENTIONS:  - Assess pt frequently for physical needs  - Identify cognitive and physical deficits and behaviors that affect risk of falls.  - [place] fall precautions as indicated by assessment.  - Educate pt/family on patient safety including physical limitations  - Instruct pt to call for assistance with activity based on assessment  - Modify  environment to reduce risk of injury  - Provide assistive devices as appropriate  - Consider OT/PT consult to assist with strengthening/mobility  - Encourage toileting schedule  Outcome: Completed     Problem: DISCHARGE PLANNING  Goal: Discharge to home or other facility with appropriate resources  Description: INTERVENTIONS:  - Identify barriers to discharge w/pt and caregiver  - Include patient/family/discharge partner in discharge planning  - Arrange for needed discharge resources and transportation as appropriate  - Identify discharge learning needs (meds, wound care, etc)  - Arrange for interpreters to assist at discharge as needed  - Consider post-discharge preferences of patient/family/discharge partner  - Complete POLST form as appropriate  - Assess patient's ability to be responsible for managing their own health  - Refer to Case Management Department for coordinating discharge planning if the patient needs post-hospital services based on physician/LIP order or complex needs related to functional status, cognitive ability or social support system  Outcome: Completed     Problem: CARDIOVASCULAR - ADULT  Goal: Maintains optimal cardiac output and hemodynamic stability  Description: INTERVENTIONS:  - Monitor vital signs, rhythm, and trends  - Monitor for bleeding, hypotension and signs of decreased cardiac output  - Evaluate effectiveness of vasoactive medications to optimize hemodynamic stability  - Monitor arterial and/or venous puncture sites for bleeding and/or hematoma  - Assess quality of pulses, skin color and temperature  - Assess for signs of decreased coronary artery perfusion - ex. Angina  - Evaluate fluid balance, assess for edema, trend weights  Outcome: Completed  Goal: Absence of cardiac arrhythmias or at baseline  Description: INTERVENTIONS:  - Continuous cardiac monitoring, monitor vital signs, obtain 12 lead EKG if indicated  - Evaluate effectiveness of antiarrhythmic and heart rate control  medications as ordered  - Initiate emergency measures for life threatening arrhythmias  - Monitor electrolytes and administer replacement therapy as ordered  Outcome: Completed     Problem: METABOLIC/FLUID AND ELECTROLYTES - ADULT  Goal: Glucose maintained within prescribed range  Description: INTERVENTIONS:  - Monitor Blood Glucose as ordered  - Assess for signs and symptoms of hyperglycemia and hypoglycemia  - Administer ordered medications to maintain glucose within target range  - Assess barriers to adequate nutritional intake and initiate nutrition consult as needed  - Instruct patient on self management of diabetes  Outcome: Completed  Goal: Electrolytes maintained within normal limits  Description: INTERVENTIONS:  - Monitor labs and rhythm and assess patient for signs and symptoms of electrolyte imbalances  - Administer electrolyte replacement as ordered  - Monitor response to electrolyte replacements, including rhythm and repeat lab results as appropriate  - Fluid restriction as ordered  - Instruct patient on fluid and nutrition restrictions as appropriate  Outcome: Completed  Goal: Hemodynamic stability and optimal renal function maintained  Description: INTERVENTIONS:  - Monitor labs and assess for signs and symptoms of volume excess or deficit  - Monitor intake, output and patient weight  - Monitor urine specific gravity, serum osmolarity and serum sodium as indicated or ordered  - Monitor response to interventions for patient's volume status, including labs, urine output, blood pressure (other measures as available)  - Encourage oral intake as appropriate  - Instruct patient on fluid and nutrition restrictions as appropriate  Outcome: Completed

## 2024-06-03 NOTE — DISCHARGE INSTRUCTIONS
You were seen in the hospital for weakness and fatigue. Based on exam, this was attributed to urinary tract infection with ongoing orthostatic hypotension. You responded well to antibiotics with improvement of your symptoms  - We carefully managed your blood pressures with managing your orthostatic/low blood pressures  - You were recommended to continue rehab at a subacute rehab  facility    Please follow-up with Dr. Li in 1-2 weeks for post-hospital follow-up    Medication changes on discharge:  -will stop midodrine for now  -hold fludrocortisone for SBP>140

## 2024-06-03 NOTE — PLAN OF CARE
Problem: Patient Centered Care  Goal: Patient preferences are identified and integrated in the patient's plan of care  Description: Interventions:  - What would you like us to know as we care for you?   - Provide timely, complete, and accurate information to patient/family  - Incorporate patient and family knowledge, values, beliefs, and cultural backgrounds into the planning and delivery of care  - Encourage patient/family to participate in care and decision-making at the level they choose  - Honor patient and family perspectives and choices  Outcome: Progressing     Problem: PAIN - ADULT  Goal: Verbalizes/displays adequate comfort level or patient's stated pain goal  Description: INTERVENTIONS:  - Encourage pt to monitor pain and request assistance  - Assess pain using appropriate pain scale  - Administer analgesics based on type and severity of pain and evaluate response  - Implement non-pharmacological measures as appropriate and evaluate response  - Consider cultural and social influences on pain and pain management  - Manage/alleviate anxiety  - Utilize distraction and/or relaxation techniques  - Monitor for opioid side effects  - Notify MD/LIP if interventions unsuccessful or patient reports new pain  - Anticipate increased pain with activity and pre-medicate as appropriate  Outcome: Progressing     Problem: SAFETY ADULT - FALL  Goal: Free from fall injury  Description: INTERVENTIONS:  - Assess pt frequently for physical needs  - Identify cognitive and physical deficits and behaviors that affect risk of falls.  - [place] fall precautions as indicated by assessment.  - Educate pt/family on patient safety including physical limitations  - Instruct pt to call for assistance with activity based on assessment  - Modify environment to reduce risk of injury  - Provide assistive devices as appropriate  - Consider OT/PT consult to assist with strengthening/mobility  - Encourage toileting schedule  Outcome:  Progressing     Problem: DISCHARGE PLANNING  Goal: Discharge to home or other facility with appropriate resources  Description: INTERVENTIONS:  - Identify barriers to discharge w/pt and caregiver  - Include patient/family/discharge partner in discharge planning  - Arrange for needed discharge resources and transportation as appropriate  - Identify discharge learning needs (meds, wound care, etc)  - Arrange for interpreters to assist at discharge as needed  - Consider post-discharge preferences of patient/family/discharge partner  - Complete POLST form as appropriate  - Assess patient's ability to be responsible for managing their own health  - Refer to Case Management Department for coordinating discharge planning if the patient needs post-hospital services based on physician/LIP order or complex needs related to functional status, cognitive ability or social support system  Outcome: Progressing     Problem: CARDIOVASCULAR - ADULT  Goal: Maintains optimal cardiac output and hemodynamic stability  Description: INTERVENTIONS:  - Monitor vital signs, rhythm, and trends  - Monitor for bleeding, hypotension and signs of decreased cardiac output  - Evaluate effectiveness of vasoactive medications to optimize hemodynamic stability  - Monitor arterial and/or venous puncture sites for bleeding and/or hematoma  - Assess quality of pulses, skin color and temperature  - Assess for signs of decreased coronary artery perfusion - ex. Angina  - Evaluate fluid balance, assess for edema, trend weights  Outcome: Progressing  Goal: Absence of cardiac arrhythmias or at baseline  Description: INTERVENTIONS:  - Continuous cardiac monitoring, monitor vital signs, obtain 12 lead EKG if indicated  - Evaluate effectiveness of antiarrhythmic and heart rate control medications as ordered  - Initiate emergency measures for life threatening arrhythmias  - Monitor electrolytes and administer replacement therapy as ordered  Outcome: Progressing      Problem: METABOLIC/FLUID AND ELECTROLYTES - ADULT  Goal: Glucose maintained within prescribed range  Description: INTERVENTIONS:  - Monitor Blood Glucose as ordered  - Assess for signs and symptoms of hyperglycemia and hypoglycemia  - Administer ordered medications to maintain glucose within target range  - Assess barriers to adequate nutritional intake and initiate nutrition consult as needed  - Instruct patient on self management of diabetes  Outcome: Progressing  Goal: Electrolytes maintained within normal limits  Description: INTERVENTIONS:  - Monitor labs and rhythm and assess patient for signs and symptoms of electrolyte imbalances  - Administer electrolyte replacement as ordered  - Monitor response to electrolyte replacements, including rhythm and repeat lab results as appropriate  - Fluid restriction as ordered  - Instruct patient on fluid and nutrition restrictions as appropriate  Outcome: Progressing  Goal: Hemodynamic stability and optimal renal function maintained  Description: INTERVENTIONS:  - Monitor labs and assess for signs and symptoms of volume excess or deficit  - Monitor intake, output and patient weight  - Monitor urine specific gravity, serum osmolarity and serum sodium as indicated or ordered  - Monitor response to interventions for patient's volume status, including labs, urine output, blood pressure (other measures as available)  - Encourage oral intake as appropriate  - Instruct patient on fluid and nutrition restrictions as appropriate  Outcome: Progressing

## 2024-06-04 ENCOUNTER — INITIAL APN SNF VISIT (OUTPATIENT)
Dept: INTERNAL MEDICINE CLINIC | Facility: SKILLED NURSING FACILITY | Age: 84
End: 2024-06-04

## 2024-06-04 VITALS
OXYGEN SATURATION: 97 % | DIASTOLIC BLOOD PRESSURE: 77 MMHG | TEMPERATURE: 98 F | HEART RATE: 80 BPM | SYSTOLIC BLOOD PRESSURE: 170 MMHG | RESPIRATION RATE: 18 BRPM

## 2024-06-04 DIAGNOSIS — F32.A DEPRESSION, UNSPECIFIED DEPRESSION TYPE: ICD-10-CM

## 2024-06-04 DIAGNOSIS — R53.1 GENERALIZED WEAKNESS: Primary | ICD-10-CM

## 2024-06-04 DIAGNOSIS — F17.200 SMOKER: ICD-10-CM

## 2024-06-04 DIAGNOSIS — M25.60 JOINT STIFFNESS: ICD-10-CM

## 2024-06-04 DIAGNOSIS — R53.83 FATIGUE DUE TO DEPRESSION: ICD-10-CM

## 2024-06-04 DIAGNOSIS — F39 MOOD DISORDER (HCC): ICD-10-CM

## 2024-06-04 DIAGNOSIS — F32.A FATIGUE DUE TO DEPRESSION: ICD-10-CM

## 2024-06-04 DIAGNOSIS — N30.00 ACUTE CYSTITIS WITHOUT HEMATURIA: ICD-10-CM

## 2024-06-04 PROCEDURE — 99310 SBSQ NF CARE HIGH MDM 45: CPT | Performed by: NURSE PRACTITIONER

## 2024-06-04 PROCEDURE — 1123F ACP DISCUSS/DSCN MKR DOCD: CPT | Performed by: NURSE PRACTITIONER

## 2024-06-04 NOTE — DISCHARGE SUMMARY
[facility]  part of [facility]    Discharge Summary    S[place] Still Patient Status:  Inpatient    1940 MRN [id number]   Location [facility] 5SW/SE Attending No att. providers found   Hosp Day # 3 PCP Telly Li MD     Date of Admission: 2024   Date of Discharge: 6/3/2024    Admitting Diagnosis: Acute cystitis without hematuria [N30.00]  Altered mental status, unspecified altered mental status type [R41.82]    Disposition: Transfer to Skilled Nursing Facility: [facility]    Discharge Diagnosis: .Principal Problem:    Acute cystitis without hematuria  Active Problems:    Acquired hypothyroidism    Acute cough    CAD (coronary artery disease)    Depression, recurrent ([facility])    Orthostatic hypotension    Weakness generalized    Failure to thrive in adult    CKD (chronic kidney disease) stage 3, GFR 30-59 ml/min ([facility])    Altered mental status, unspecified altered mental status type    Left ankle swelling    ALMA DELIA (acute kidney injury) ([facility])      Hospital Course:   Reason for Admission:   Altered mental status, acute cystitis    Discharge Physical Exam:  Vital Signs:  Blood pressure 140/77, pulse 74, temperature 98 °F (36.7 °C), temperature source Oral, resp. rate 18, weight 144 lb 6.4 oz (65.5 kg), SpO2 94%, not currently breastfeeding.     GENERAL: well developed, well nourished, in no apparent distress  LUNGS: clear to auscultation  CARDIO: RRR, normal S1S2, without murmur or gallop  GI: soft, NT, ND, NABS  EXTREMITIES: no cyanosis, clubbing or edema (localized left lateral ankle swelling without tenderness)    Hospital Course: Ms. Still is an 83 yo resident of [facility] presenting with altered mental status and weakness. Pt doesn't exactly recall why she came in for evaluation but does report having worsening cough the past few days. Cough is nonproductive. She denies fever or change in appetite.  She was treated with empiric antibiotics with urine  culture revealing Aerococcus urinae.  She gradually responded to medical therapy.  She had return to baseline mental status.  Blood pressure medications including the suffused for orthostatic hypotension were carefully titrated.  Midodrine was discontinued today in favor of fludrocortisone given supine hypertension.  PT/OT recommended subacute rehab.  Will plan discharge today as follows:    Acute cystitis without hematuria  UA +WBCs  Urine cx -- 5/31 aerococus urinae  Blood cx NGTD  Lactic acid normal  Initial WBC 15.4 > normalized  -day 3 ceftriaxone -- stopped 6/2, switched to Amoxicillin for a total of 7 days Abx. EOT: 6/6/2024     Generalized weakness  -pt was apparently ambulatory at home w/a walker  -currently max assist with PT  -cont PT/OT  -will need AMANDA     Mild anemia  -baseline Hgb in the 12's  -Hgb 13.3 on admission, then 11.9 >11.8 -- suspect due to hemodilution (pt was dry on admission)  -check B12 (normal in 8/2023) - close to normal range, iron studies suggestive of chronic inflammation     Orthostatic hypotension  -on fludrocortisone and Midodrine 5mg BID (increased from 2.5mg BID in 8/2023)  -BP has been running high in past 2 days (140's-170's systolic) so midodrine not given  -will stop midodrine for now  -hold fludrocortisone for SBP>140  -check orthostatic BP once pt able to get up.     Cough  CXR with small left basilar opacity c/w atelectasis/scarring  Lungs clear on exam  No cough noted this am     Altered mental status, unspecified altered mental status type  -suspect 2/2 infection, dehydration  Head CT negative  -more awake, alert today     Ankle pain, left  Xray 5/31/24 neg for fracture; +soft tissue swelling  Pt denies pain today.  No tenderness on exam     ALMA DELIA on CKD stage 3a  Baseline Cr 0.8; on admission 1.12  Creat normalized 1.12 > 0.81 with IVF's (now stopped)  - Cr today 0.79     Fatigue  -chronic issue.  Likely multifactorial in the setting of aging, urinary incontinence  leading to nocturia up to 4 times a night, poor p.o. intake.     Urinary incontinence  Has been requiring the use of pads, most prominent at nighttime.  - Stopped oxybutinin as was not helping     Joint stiffness  Localized to both hands, ongoing for quite some time.  Suspect osteoarthritis as no evidence of tenosynovitis on my examination  - Check x-ray of the bilateral hands: Prior x-rays 5/2022 did demonstrate osteoarthritis  - BATSHEVA was 1: 80, though overall low suspicion for rheumatological disease  - Can perform home exercises in addition to continuing with Tylenol on an as-needed basis for symptom support     Coronary artery disease  Hx of Calcium score > 400, followed with Dr. Jimenez, last seen 2/24/2022  -Dr. Jimenez retired; pt has not yet established with new cardiologist  -on lipitor  -pt has declined aspirin therapy     Hypothyroidism  - no longer on liothyronine  - Levothyroxine 88 mcg  -TSH 1.54 >0.384  - decrease levothyroxine to 75mcg/day  - repeat TSH in 3 mos (9/2024)     Chronic L1 compression fracture  - On home gabapentin 100 mg TID PRN  - Stable on current regimen     Depression  -Effexor 37.5 mg once a day.       Active tobacco use  - Evidence of emphysema on CT calcium score 2/24/2022  -Discussed the need to stop smoking altogether, aware of the risk for lung cancer, COPD  -Recommended gradual weaning off of cigarettes by decreasing 1-2 cigarettes every 1-2 weeks.  -We also discussed nicotine replacement therapy  - Currently smoking about 7 cigarettes/day               Complications: None    Consultants         Provider   Role Specialty     Eduarda Bajwa MD      Consulting Physician Internal Medicine            Pending Labs       Order Current Status    Blood Culture Preliminary result    Blood Culture Preliminary result            Discharge Plan:   Discharge Condition: Stable    Discharge Medication List as of 6/3/2024  1:36 PM        Home Meds - Modified    Details   amoxicillin 500 MG Oral  Cap Take 1 capsule (500 mg total) by mouth in the morning, at noon, and at bedtime for 5 days., Print Script, Disp-15 capsule, R-0           Home Meds - Unchanged    Details   lidocaine-menthol 4-1 % External Patch Place 1 patch onto the skin daily., Normal, Disp-30 patch, R-0      vancomycin 125 MG Oral Cap Take 1 capsule (125 mg total) by mouth daily., Historical      QUEtiapine 25 MG Oral Tab Take 1 tablet (25 mg total) by mouth nightly., Print Script, Disp-30 tablet, R-0      venlafaxine ER 37.5 MG Oral Capsule SR 24 Hr Take 1 capsule (37.5 mg total) by mouth daily., Normal, Disp-90 capsule, R-3      levothyroxine 88 MCG Oral Tab Take 1 tablet (88 mcg total) by mouth before breakfast., Normal, Disp-90 tablet, R-3      ATORVASTATIN 20 MG Oral Tab Take 1 tablet (20 mg total) by mouth daily., Normal, Disp-90 tablet, R-3      FLUDROCORTISONE 0.1 MG Oral Tab Take 1 tablet (0.1 mg total) by mouth daily., Normal, Disp-90 tablet, R-3      gabapentin 100 MG Oral Cap TAKE 1 CAPSULE BY MOUTH THREE TIMES DAILY AS NEEDED FOR PAIN, Normal, Disp-100 capsule, R-3                 Discharge Diet: As tolerated    Discharge Activity: As tolerated    Follow up:      Follow-up Information       Telly Li MD. Schedule an appointment as soon as possible for a visit.    Specialty: Internal Medicine  Why: Post-hosptial follow-up in 1-2 weeks  Contact information:  [address] [place] [address] [id number] [phone]                             Follow up Labs: None         Other Discharge Instructions:         You were seen in the hospital for weakness and fatigue. Based on exam, this was attributed to urinary tract infection with ongoing orthostatic hypotension. You responded well to antibiotics with improvement of your symptoms  - We carefully managed your blood pressures with managing your orthostatic/low blood pressures  - You were recommended to continue rehab at a subacute rehab  facility    Please follow-up with Dr. Li in 1-2  weeks for post-hospital follow-up    Medication changes on discharge:  -will stop midodrine for now  -hold fludrocortisone for SBP>140        I spent > 30 minutes in the coordination of care    Telly Li MD  6/3/2024

## 2024-06-04 NOTE — PROGRESS NOTES
Sugar Still  : 1940  Age 84 year old  female patient is admitted to [facility] 6/3/24 for rehab and strengthening     Chief complaint: : Acute cystitis without hematuria ,Altered mental status    [facility] Admission : 24 to 6/3/24    HPI   83 yo resident of [facility] presented to [facility] ED 24  with altered mental status and weakness. Pt didn't  exactly recall why she came in for evaluation but did  report having worsening cough the past few days. Cough is nonproductive. She denied fever or change in appetite.  She was treated with empiric antibiotics with urine culture revealing Aerococcus urinae.  She gradually responded to medical therapy.  She  returned to baseline mental status.  Blood pressure medications including the suffused for orthostatic hypotension were carefully titrated.  Midodrine was discontinued  in favor of fludrocortisone given supine hypertension.  PT/OT recommended subacute rehab.  Patient discharged to [facility] for continued conditioning and monitoring.     Patient seen as a new admission, vss, no reported fevers or chills.  Patient reports overall weakness and reports  did  not have therapy eval  yet today. Reports feeling hungry.  Energy level very low, oriented x 3 but forgetful , poor historian . Still unclear why she is in rehab and how she ended up in hospital.  Lungs clear, HR regular. Denies pain or discomfort. Reports no other concerns or issues.        Past Medical History:    Abdominal aortic aneurysm (AAA) without rupture (HCC)    Acquired hypothyroidism    Agatston CAC score, >400    Arthritis    Atherosclerosis of aorta (HCC)    Chronic bronchitis (HCC)    Closed compression fracture of L1 vertebra (HCC)    Depression, recurrent (HCC)    Hypothyroid    Orthostatic hypertension     Past Surgical History:   Procedure Laterality Date    Appendectomy      Hip replacement surgery Right     Hysterectomy      Other surgical history  16     SBO      Family History   Problem Relation Age of Onset    Stroke Mother     Schizophrenia Daughter     Other (hodkins lymphoma) Son      Social History     Socioeconomic History    Marital status:    Tobacco Use    Smoking status: Every Day     Current packs/day: 0.25     Types: Cigarettes    Smokeless tobacco: Never    Tobacco comments:     4-5; 60 years   Vaping Use    Vaping status: Never Used   Substance and Sexual Activity    Alcohol use: No    Drug use: No    Sexual activity: Never     Social Determinants of Health     Food Insecurity: No Food Insecurity (6/1/2024)    Food Insecurity     Food Insecurity: Never true   Transportation Needs: No Transportation Needs (6/1/2024)    Transportation Needs     Lack of Transportation: No   Housing Stability: Low Risk  (6/1/2024)    Housing Stability     Housing Instability: No       ALLERGIES:No Known Allergies    CODE STATUS:  DNAR /SELECT    ADVANCED CARE PLANNING TEAM: will need family care plan , had been  living  at [facility]       CURRENT MEDICATIONS - reviewed and updated     Current Outpatient Medications   Medication Sig Dispense Refill    amoxicillin 500 MG Oral Cap Take 1 capsule (500 mg total) by mouth in the morning, at noon, and at bedtime for 5 days. 15 capsule 0    lidocaine-menthol 4-1 % External Patch Place 1 patch onto the skin daily. 30 patch 0    vancomycin 125 MG Oral Cap Take 1 capsule (125 mg total) by mouth daily.      QUEtiapine 25 MG Oral Tab Take 1 tablet (25 mg total) by mouth nightly. 30 tablet 0    venlafaxine ER 37.5 MG Oral Capsule SR 24 Hr Take 1 capsule (37.5 mg total) by mouth daily. 90 capsule 3    levothyroxine 88 MCG Oral Tab Take 1 tablet (88 mcg total) by mouth before breakfast. 90 tablet 3    ATORVASTATIN 20 MG Oral Tab Take 1 tablet (20 mg total) by mouth daily. 90 tablet 3    FLUDROCORTISONE 0.1 MG Oral Tab Take 1 tablet (0.1 mg total) by mouth daily. 90 tablet 3    gabapentin 100 MG Oral Cap TAKE 1 CAPSULE BY MOUTH THREE  TIMES DAILY AS NEEDED FOR PAIN 100 capsule 3       VITALS:  BP (!) 170/77   Pulse 80   Temp 97.8 °F (36.6 °C)   Resp 18   SpO2 97%      REVIEW OF SYSTEMS:  GENERAL HEALTH:feels well otherwise  SKIN: denies any unusual skin lesions or rashes  WOUNDS: none  EYES:no visual complaints or deficits  HENT: denies nasal congestion, sinus pain or sore throat;  RESPIRATORY: denies shortness of breath, wheezing or cough   CARDIOVASCULAR:denies chest pain, no palpitations   GI: denies nausea, vomiting, constipation, diarrhea; no rectal bleeding; no heartburn  :no dysuria, urgency or frequency; no vaginal discharge; no urinary incontinence; no hematuria  MUSCULOSKELETAL:generalized weakness   NEURO:no sensory or motor complaint  PSYCHE: no symptoms of depression or anxiety  HEMATOLOGY:denies hx anemia  ENDOCRINE: denies excessive thirst or urination; denies unexpected wt gain or wt loss  ALLERGY/IMM.: denies food or seasonal allergies      PHYSICAL EXAM:  GENERAL HEALTH: frail 85 y/o with overall weakness , lying in bed , oriented x 3 but not a good historian , forgetful   LINES, TUBES, DRAINS:  none  SKIN: no rashes, no suspicious lesions  WOUND: none  EYES: PERRLA, EOMI, sclera anicteric, conjunctiva normal; there is no nystagmus, no drainage from eyes  HENT: normocephalic; normal nose, no nasal drainage, mucous membranes pink, moist, pharynx no exudate, no visible cerumen.  NECK: supple; FROM; no JVD, no TMG, no carotid bruits  BREAST: deferred  RESPIRATORY:clear to percussion and auscultation  CARDIOVASCULAR: S1, S2 normal, RRR; no S3, no S4;   ABDOMEN:  normal active BS+, soft, nondistended; no organomegaly, no masses; no bruits; nontender, no guarding, no rebound tenderness.  :no suprapubic distension  LYMPHATIC:no lymphedema  MUSCULOSKELETAL: no acute synovitis upper or lower extremity  EXTREMITIES/VASCULAR:no cyanosis, clubbing or edema  NEUROLOGIC: follows commands  PSYCHIATRIC: alert and oriented x 3; affect  appropriate      MEDICAL DECISION MAKING  Capable/unclear      DIAGNOSTICS REVIEWED AT THIS VISIT:  Reviewed [facility] records     SEE PLAN BELOW  Acute cystitis without hematuria  UA +WBCs  Urine cx -- 5/31 aerococus urinae  Blood cx NGTD  Lactic acid normal  Initial WBC 15.4 >  now normalized  -day 3 ceftriaxone -- stopped 6/2, switched to Amoxicillin  500 mg po tid for a total of 7 days Abx. EOT: 6/6/2024  -cont vanco 125mg po daily proph      Generalized weakness  -pt was apparently ambulatory at home w/a walker  -currently max assist with PT  -cont PT/OT     Mild anemia  -baseline Hgb in the 12's  -Hgb 13.3 on admission, then 11.9 >11.8 -- suspect due to hemodilution (pt was dry on admission)  -check B12 (normal in 8/2023) - close to normal range, iron studies suggestive of chronic inflammation  -weekly cbc and bmp in rehab      Orthostatic hypotension  -on fludrocortisone and Midodrine 5mg BID (increased from 2.5mg BID in 8/2023)  -BP has been running high in past 2 days (140's-170's systolic) so midodrine not given  -will stop midodrine for now  -hold fludrocortisone 0.1 mg po q day for SBP>140  -check orthostatic BP once pt able to get up prn  -VS q shift and prn      Cough  CXR with small left basilar opacity c/w atelectasis/scarring  Lungs clear on exam  No cough noted this am     Altered mental status, unspecified altered mental status type  -suspect 2/2 infection, dehydration  Head CT negative  -more awake, alert but overall very tired and overall very weak  -unclear of baseline but appears oriented x 3 but very forgetful and poor historian and almost lethargic in appearance      Ankle pain, left  Xray 5/31/24 neg for fracture; +soft tissue swelling  Pt denies pain.  No tenderness on exam  -left ankle with dressing in place      ALMA DELIA on CKD stage 3a  Baseline Cr 0.8; on admission 1.12  Creat normalized 1.12 > 0.81 with IVF's (now stopped)  - Cr today 0.79     Fatigue  -chronic issue.  Likely multifactorial in  the setting of aging, urinary incontinence leading to nocturia up to 4 times a night, poor p.o. intake.     Urinary incontinence  Has been requiring the use of pads, most prominent at nighttime.  - Stopped oxybutinin as was not helping     Joint stiffness  Localized to both hands, ongoing for quite some time.  Suspect osteoarthritis as no evidence of tenosynovitis on my examination  - Check x-ray of the bilateral hands: Prior x-rays 5/2022 did demonstrate osteoarthritis  - BATSHEVA was 1: 80, though overall low suspicion for rheumatological disease  - Can perform home exercises in addition to continuing with Tylenol on an as-needed basis for symptom support  -cont lidoderm patch 4% to back of neck q 12 hrs   -gabapentin 100mg po q 8 prn      Coronary artery disease  Hx of Calcium score > 400, followed with Dr. Jimenez, last seen 2/24/2022  -Dr. Jimenez retired; pt has not yet established with new cardiologist  -on lipitor  -pt has declined aspirin therapy  -weekly cbc and bmp in rehab      Hypothyroidism  - no longer on liothyronine  - Levothyroxine 88 mcg po daily   -TSH 1.54 >0.384  - repeat TSH in 3 mos (9/2024)     Chronic L1 compression fracture  - On home gabapentin 100 mg TID PRN  - Stable on current regimen     Depression/mood disorder  -Effexor 37.5 mg po daily   -cont Seroquel 25mg po q hs  .  -not participating well in conversation , forgetful , reports no energy even to speak  -Psych to see in rehab     HLD  -Cont atorvastatin 20mg po q day        Active tobacco use  - Evidence of emphysema on CT calcium score 2/24/2022  -Discussed the need to stop smoking altogether, aware of the risk for lung cancer, COPD  -Recommended gradual weaning off of cigarettes by decreasing 1-2 cigarettes every 1-2 weeks.  -We also discussed nicotine replacement therapy  - Currently smoking about 7 cigarettes/day                  Consultants           Provider   Role Specialty      Eduarda Bajwa MD       Consulting Physician Internal  Medicine              This is a 35 minute visit and greater than 50% of the time was spent counseling the patient and/or coordinating care.    RADHA Suarez  06/04/24   11:26 AM

## 2024-06-05 ENCOUNTER — EXTERNAL FACILITY (OUTPATIENT)
Dept: INTERNAL MEDICINE CLINIC | Facility: CLINIC | Age: 84
End: 2024-06-05

## 2024-06-05 DIAGNOSIS — R53.1 WEAKNESS GENERALIZED: ICD-10-CM

## 2024-06-05 DIAGNOSIS — R41.82 ALTERED MENTAL STATUS, UNSPECIFIED ALTERED MENTAL STATUS TYPE: Primary | ICD-10-CM

## 2024-06-05 DIAGNOSIS — I95.1 ORTHOSTATIC HYPOTENSION: ICD-10-CM

## 2024-06-05 DIAGNOSIS — N30.00 ACUTE CYSTITIS WITHOUT HEMATURIA: ICD-10-CM

## 2024-06-12 ENCOUNTER — EXTERNAL FACILITY (OUTPATIENT)
Dept: INTERNAL MEDICINE CLINIC | Facility: CLINIC | Age: 84
End: 2024-06-12

## 2024-06-12 DIAGNOSIS — I95.1 ORTHOSTATIC HYPOTENSION: ICD-10-CM

## 2024-06-12 DIAGNOSIS — R53.1 WEAKNESS GENERALIZED: Primary | ICD-10-CM

## 2024-06-12 DIAGNOSIS — D64.9 ANEMIA, UNSPECIFIED TYPE: ICD-10-CM

## 2024-06-12 PROCEDURE — 99308 SBSQ NF CARE LOW MDM 20: CPT | Performed by: INTERNAL MEDICINE

## 2024-06-13 ENCOUNTER — SNF VISIT (OUTPATIENT)
Dept: INTERNAL MEDICINE CLINIC | Facility: SKILLED NURSING FACILITY | Age: 84
End: 2024-06-13

## 2024-06-13 VITALS
HEART RATE: 61 BPM | DIASTOLIC BLOOD PRESSURE: 77 MMHG | RESPIRATION RATE: 18 BRPM | TEMPERATURE: 98 F | SYSTOLIC BLOOD PRESSURE: 129 MMHG | OXYGEN SATURATION: 98 % | BODY MASS INDEX: 25 KG/M2 | WEIGHT: 143 LBS

## 2024-06-13 DIAGNOSIS — N39.0 URINARY TRACT INFECTION WITHOUT HEMATURIA, SITE UNSPECIFIED: ICD-10-CM

## 2024-06-13 DIAGNOSIS — I95.9 HYPOTENSION, UNSPECIFIED HYPOTENSION TYPE: ICD-10-CM

## 2024-06-13 DIAGNOSIS — M25.572 LEFT ANKLE PAIN, UNSPECIFIED CHRONICITY: ICD-10-CM

## 2024-06-13 DIAGNOSIS — D64.9 MILD ANEMIA: ICD-10-CM

## 2024-06-13 DIAGNOSIS — R41.82 ALTERED MENTAL STATUS, UNSPECIFIED ALTERED MENTAL STATUS TYPE: ICD-10-CM

## 2024-06-13 DIAGNOSIS — R53.1 GENERALIZED WEAKNESS: ICD-10-CM

## 2024-06-13 DIAGNOSIS — Z75.8 DISCHARGE PLANNING ISSUES: Primary | ICD-10-CM

## 2024-06-13 NOTE — PROGRESS NOTES
Sugar Still  : 1940  Age 84 year old  female patient is admitted to   [facility] 6/3/24 for rehab and strengthening      Chief complaint: : Acute cystitis without hematuria ,Altered mental status     [facility] Admission : 24 to 6/3/24     HPI   83 yo resident of [facility] living presented to [facility] ED 24  with altered mental status and weakness. Pt didn't  exactly recall why she came in for evaluation but did  report having worsening cough the past few days. Cough is nonproductive. She denied fever or change in appetite.  She was treated with empiric antibiotics with urine culture revealing Aerococcus urinae.  She gradually responded to medical therapy.  She  returned to baseline mental status.  Blood pressure medications including the suffused for orthostatic hypotension were carefully titrated.  Midodrine was discontinued  in favor of fludrocortisone given supine hypertension.  PT/OT recommended subacute rehab.  Patient discharged to [facility] for continued conditioning and monitoring.      Patient seen in follow up , vss, no reported fevers or chills.  Patient reports overall weakness and reports  participating well in therapy .  Energy level very improving , oriented x 3 but forgetful , poor historian . Seen participating in therapy and trying. . Speech working on advancing diet- on mechanical soft thin liquids. , Per  she needs to be advanced to be able to return to [place].  Will f/u with [facility] per their policy , patient does not have any teeth ( no dentures) , she reports she can eat most foods but need to be soft , no nuts chips etc.   Lungs clear, HR regular. Denies pain or discomfort. Reports no other concerns or issues.        ALLERGIES:No Known Allergies    CODE STATUS:  DNR    ADVANCED CARE PLANNING TEAM: will need family care plan , had been living at [facility]     CURRENT MEDICATIONS - reviewed and updated     Current Outpatient Medications   Medication Sig Dispense  Refill    lidocaine-menthol 4-1 % External Patch Place 1 patch onto the skin daily. 30 patch 0    vancomycin 125 MG Oral Cap Take 1 capsule (125 mg total) by mouth daily.      QUEtiapine 25 MG Oral Tab Take 1 tablet (25 mg total) by mouth nightly. 30 tablet 0    venlafaxine ER 37.5 MG Oral Capsule SR 24 Hr Take 1 capsule (37.5 mg total) by mouth daily. 90 capsule 3    levothyroxine 88 MCG Oral Tab Take 1 tablet (88 mcg total) by mouth before breakfast. 90 tablet 3    ATORVASTATIN 20 MG Oral Tab Take 1 tablet (20 mg total) by mouth daily. 90 tablet 3    FLUDROCORTISONE 0.1 MG Oral Tab Take 1 tablet (0.1 mg total) by mouth daily. 90 tablet 3    gabapentin 100 MG Oral Cap TAKE 1 CAPSULE BY MOUTH THREE TIMES DAILY AS NEEDED FOR PAIN 100 capsule 3       VITALS:  /77   Pulse 61   Temp 97.7 °F (36.5 °C)   Resp 18   Wt 143 lb (64.9 kg)   SpO2 98%   BMI 24.55 kg/m²       REVIEW OF SYSTEMS:  GENERAL HEALTH:feels well otherwise  SKIN: denies any unusual skin lesions or rashes  WOUNDS: none  EYES:no visual complaints or deficits  HENT: denies nasal congestion, sinus pain or sore throat;  RESPIRATORY: denies shortness of breath, wheezing or cough   CARDIOVASCULAR:denies chest pain, no palpitations   GI: denies nausea, vomiting, constipation, diarrhea; no rectal bleeding; no heartburn  :no dysuria, urgency or frequency; no vaginal discharge; no urinary incontinence; no hematuria  MUSCULOSKELETAL:generalized weakness   NEURO:no sensory or motor complaint  PSYCHE: no symptoms of depression or anxiety  HEMATOLOGY:denies hx anemia  ENDOCRINE: denies excessive thirst or urination; denies unexpected wt gain or wt loss  ALLERGY/IMM.: denies food or seasonal allergies        PHYSICAL EXAM:  GENERAL HEALTH: frail 85 y/o with overall weakness but improving, seen participating in therapy in gym   LINES, TUBES, DRAINS:  none  SKIN: no rashes, no suspicious lesions  WOUND: none  EYES: PERRLA, EOMI, sclera anicteric, conjunctiva  normal; there is no nystagmus, no drainage from eyes  HENT: normocephalic; normal nose, no nasal drainage, mucous membranes pink, moist, pharynx no exudate, no visible cerumen.  NECK: supple; FROM; no JVD, no TMG, no carotid bruits  BREAST: deferred  RESPIRATORY:clear to percussion and auscultation  CARDIOVASCULAR: S1, S2 normal, RRR; no S3, no S4;   ABDOMEN:  normal active BS+, soft, nondistended; no organomegaly, no masses; no bruits; nontender, no guarding, no rebound tenderness.  :no suprapubic distension  LYMPHATIC:no lymphedema  MUSCULOSKELETAL: no acute synovitis upper or lower extremity  EXTREMITIES/VASCULAR:no cyanosis, clubbing or edema  NEUROLOGIC: follows commands  PSYCHIATRIC: alert and oriented x 3; forgetful      SEE PLAN BELOW  Acute cystitis without hematuria  UA +WBCs  Urine cx -- 5/31 aerococus urinae  Blood cx NGTD  Lactic acid normal  Initial WBC 15.4 >  now normalized  -day 3 ceftriaxone -- stopped 6/2, switched to Amoxicillin  500 mg po tid for a total of 7 days Abx. Completed  6/6/2024  - dc  vanco 125mg po daily proph 6/13/24     Generalized weakness  -pt was apparently ambulatory at home w/a walker  -currently max assist with PT  -cont PT/OT     Mild anemia  -baseline Hgb in the 12's  -Hgb 13.3 on admission, then 11.9 >11.8 -- suspect due to hemodilution (pt was dry on admission)  -check B12 (normal in 8/2023) - close to normal range, iron studies suggestive of chronic inflammation  -weekly cbc and bmp in rehab      Orthostatic hypotension  -on fludrocortisone and Midodrine 5mg BID (increased from 2.5mg BID in 8/2023)  -BP has been running high in past 2 days (140's-170's systolic) so midodrine not given  -will stop midodrine for now  -hold fludrocortisone 0.1 mg po q day for SBP>140  -check orthostatic BP once pt able to get up prn  -VS q shift and prn      Cough  CXR with small left basilar opacity c/w atelectasis/scarring  Lungs clear on exam  No cough noted this am     Altered mental  status, unspecified altered mental status type  -suspect 2/2 infection, dehydration  Head CT negative  -more awake, alert but overall very tired and overall very weak  -unclear of baseline but appears oriented x 3 but very forgetful and poor historian and no longer  lethargic in appearance      Ankle pain, left  Xray 5/31/24 neg for fracture; +soft tissue swelling  Pt denies pain.  No tenderness on exam  -left ankle with dressing in place   -Lidoderm patch 5% prn , doesn't want it currently      ALMA DELIA on CKD stage 3a  Baseline Cr 0.8; on admission 1.12  Creat normalized 1.12 > 0.81 with IVF's (now stopped)  - Cr today 0.79     Fatigue  -chronic issue.  Likely multifactorial in the setting of aging, urinary incontinence leading to nocturia up to 4 times a night, poor p.o. intake.     Urinary incontinence  Has been requiring the use of pads, most prominent at nighttime.  - Stopped oxybutinin as was not helping     Joint stiffness  Localized to both hands, ongoing for quite some time.  Suspect osteoarthritis as no evidence of tenosynovitis on my examination  - Check x-ray of the bilateral hands: Prior x-rays 5/2022 did demonstrate osteoarthritis  - BATSHEVA was 1: 80, though overall low suspicion for rheumatological disease  - Can perform home exercises in addition to continuing with Tylenol on an as-needed basis for symptom support  -cont lidoderm patch 4% to back of neck q 12 hrs   -gabapentin 100mg po q 8 prn      Coronary artery disease  Hx of Calcium score > 400, followed with Dr. Jimenez, last seen 2/24/2022  -Dr. Jimenez retired; pt has not yet established with new cardiologist  -on lipitor  -pt has declined aspirin therapy  -weekly cbc and bmp in rehab      Hypothyroidism  - no longer on liothyronine  - Levothyroxine 88 mcg po daily   -TSH 1.54 >0.384  - repeat TSH in 3 mos (9/2024)     Chronic L1 compression fracture  - On home gabapentin 100 mg TID PRN  - Stable on current regimen     Depression/mood disorder  -Effexor  37.5 mg po daily   -cont Seroquel 25mg po q hs  .  -not participating well in conversation , forgetful , reports no energy even to speak  -Psych to see in rehab      HLD  -Cont atorvastatin 20mg po q day        Active tobacco use  - Evidence of emphysema on CT calcium score 2/24/2022  -Discussed the need to stop smoking altogether, aware of the risk for lung cancer, COPD  -Recommended gradual weaning off of cigarettes by decreasing 1-2 cigarettes every 1-2 weeks.  -We also discussed nicotine replacement therapy  - unclear what she is  smoking  daily . Was about 7 cigarettes/day before admission                   Consultants           Provider   Role Specialty      Eduadra Bajwa MD       Consulting Physician Internal Medicine            This is a 35 minute visit and greater than 50% of the time was spent counseling the patient and/or coordinating care.    Stacy Dunlap, APRN  06/13/24   1:46 PM

## 2024-06-18 ENCOUNTER — SNF VISIT (OUTPATIENT)
Dept: INTERNAL MEDICINE CLINIC | Facility: SKILLED NURSING FACILITY | Age: 84
End: 2024-06-18

## 2024-06-18 VITALS
HEART RATE: 70 BPM | OXYGEN SATURATION: 98 % | DIASTOLIC BLOOD PRESSURE: 67 MMHG | BODY MASS INDEX: 25 KG/M2 | TEMPERATURE: 97 F | WEIGHT: 148 LBS | SYSTOLIC BLOOD PRESSURE: 139 MMHG | RESPIRATION RATE: 18 BRPM

## 2024-06-18 DIAGNOSIS — D64.9 ANEMIA, UNSPECIFIED TYPE: ICD-10-CM

## 2024-06-18 DIAGNOSIS — N30.00 ACUTE CYSTITIS WITHOUT HEMATURIA: ICD-10-CM

## 2024-06-18 DIAGNOSIS — Z91.81 AT MODERATE RISK FOR FALL: ICD-10-CM
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
## 2024-06-18 DIAGNOSIS — I95.9 HYPOTENSION, UNSPECIFIED HYPOTENSION TYPE: ICD-10-CM

## 2024-06-18 DIAGNOSIS — R53.1 GENERALIZED WEAKNESS: Primary | ICD-10-CM

## 2024-06-18 PROCEDURE — 99309 SBSQ NF CARE MODERATE MDM 30: CPT | Performed by: NURSE PRACTITIONER

## 2024-06-18 NOTE — PROGRESS NOTES
Sugar Still  : 1940  Age 84 year old  female patient is admitted to   [facility] 6/3/24 for rehab and strengthening      Chief complaint: : Acute cystitis without hematuria ,Altered mental status     [facility] Admission : 24 to 6/3/24     HPI   83 yo resident of [facility] assisted [facility] presented to [facility] ED 24  with altered mental status and weakness. Pt didn't  exactly recall why she came in for evaluation but did  report having worsening cough the past few days. Cough is nonproductive. She denied fever or change in appetite.  She was treated with empiric antibiotics with urine culture revealing Aerococcus urinae.  She gradually responded to medical therapy.  She  returned to baseline mental status.  Blood pressure medications including the suffused for orthostatic hypotension were carefully titrated.  Midodrine was discontinued  in favor of fludrocortisone given supine hypertension.  PT/OT recommended subacute rehab.  Patient discharged to [facility] for continued conditioning and monitoring.      Patient seen in follow up , vss, no reported fevers or chills.  Seen sitting in big activity room ,waiting for her meal, reports doing better .  Patient reports overall weakness improving slowly  and reports  participating well in therapy, reports needing more time in therapy  .  Energy level very improving , oriented x 3 but forgetful , poor historian . Seen participating in therapy and trying. . Speech working on advancing diet- on mechanical soft  and thin liquids to soft regular diet.  Will f/u with [facility] per their policy , patient does not have any teeth ( no dentures) , she reports she can eat most foods but need to be soft , no nuts chips etc. BRYANT reports it should be ok if she is general diet but softer foods recommended kaushik she doesn't have teeth, SW will cont to follow up with Elisha because her diet has never been a concern before.   .    Lungs clear, HR regular. Denies pain or  discomfort. Reports no other concerns or issues.         ALLERGIES:No Known Allergies    CODE STATUS:  DNR    ADVANCED CARE PLANNING TEAM: will need family care plan , had been living at [facility] , reports not ready yet to go back     CURRENT MEDICATIONS - reviewed and updated     Current Outpatient Medications   Medication Sig Dispense Refill    lidocaine-menthol 4-1 % External Patch Place 1 patch onto the skin daily. 30 patch 0    vancomycin 125 MG Oral Cap Take 1 capsule (125 mg total) by mouth daily.      QUEtiapine 25 MG Oral Tab Take 1 tablet (25 mg total) by mouth nightly. 30 tablet 0    venlafaxine ER 37.5 MG Oral Capsule SR 24 Hr Take 1 capsule (37.5 mg total) by mouth daily. 90 capsule 3    levothyroxine 88 MCG Oral Tab Take 1 tablet (88 mcg total) by mouth before breakfast. 90 tablet 3    ATORVASTATIN 20 MG Oral Tab Take 1 tablet (20 mg total) by mouth daily. 90 tablet 3    FLUDROCORTISONE 0.1 MG Oral Tab Take 1 tablet (0.1 mg total) by mouth daily. 90 tablet 3    gabapentin 100 MG Oral Cap TAKE 1 CAPSULE BY MOUTH THREE TIMES DAILY AS NEEDED FOR PAIN 100 capsule 3       VITALS:  /67   Pulse 70   Temp 97 °F (36.1 °C)   Resp 18   Wt 148 lb (67.1 kg)   SpO2 98%   BMI 25.40 kg/m²       REVIEW OF SYSTEMS:  GENERAL HEALTH:feels well otherwise  SKIN: denies any unusual skin lesions or rashes  WOUNDS: none  EYES:no visual complaints or deficits  HENT: denies nasal congestion, sinus pain or sore throat;  RESPIRATORY: denies shortness of breath, wheezing or cough   CARDIOVASCULAR:denies chest pain, no palpitations   GI: denies nausea, vomiting, constipation, diarrhea; no rectal bleeding; no heartburn  :no dysuria, urgency or frequency; no vaginal discharge; no urinary incontinence; no hematuria  MUSCULOSKELETAL:generalized weakness   NEURO:no sensory or motor complaint  PSYCHE: no symptoms of depression or anxiety  HEMATOLOGY:denies hx anemia  ENDOCRINE: denies excessive thirst or urination; denies  unexpected wt gain or wt loss  ALLERGY/IMM.: denies food or seasonal allergies        PHYSICAL EXAM:  GENERAL HEALTH: frail 83 y/o with overall weakness but improving, seen participating in therapy in gym   LINES, TUBES, DRAINS:  none  SKIN: no rashes, no suspicious lesions  WOUND: none  EYES: PERRLA, EOMI, sclera anicteric, conjunctiva normal; there is no nystagmus, no drainage from eyes  HENT: normocephalic; normal nose, no nasal drainage, mucous membranes pink, moist, pharynx no exudate, no visible cerumen.  NECK: supple; FROM; no JVD, no TMG, no carotid bruits  BREAST: deferred  RESPIRATORY:clear to percussion and auscultation  CARDIOVASCULAR: S1, S2 normal, RRR; no S3, no S4;   ABDOMEN:  normal active BS+, soft, nondistended; no organomegaly, no masses; no bruits; nontender, no guarding, no rebound tenderness.  :no suprapubic distension  LYMPHATIC:no lymphedema  MUSCULOSKELETAL: no acute synovitis upper or lower extremity  EXTREMITIES/VASCULAR:no cyanosis, clubbing or edema  NEUROLOGIC: follows commands  PSYCHIATRIC: alert and oriented x 3; forgetful      SEE PLAN BELOW  Acute cystitis without hematuria  UA +WBCs  Urine cx -- 5/31 aerococus urinae  Blood cx NGTD  Lactic acid normal  Initial WBC 15.4 >  now normalized  -day 3 ceftriaxone -- stopped 6/2, switched to Amoxicillin  500 mg po tid for a total of 7 days Abx. Completed  6/6/2024  - dc  vanco 125mg po daily proph 6/13/24     Generalized weakness  -pt was apparently ambulatory at home w/a walker  -currently max assist with PT  -cont PT/OT     Mild anemia  -baseline Hgb in the 12's  -Hgb 13.3 on admission, then 11.9 >11.8 -- suspect due to hemodilution (pt was dry on admission)  -check B12 (normal in 8/2023) - close to normal range, iron studies suggestive of chronic inflammation  -weekly cbc and bmp in rehab      Orthostatic hypotension- stable   -on fludrocortisone and Midodrine 5mg BID (increased from 2.5mg BID in 8/2023)  -BP has been running high in  past 2 days (140's-170's systolic) so midodrine not given  -will stop midodrine for now  -hold fludrocortisone 0.1 mg po q day for SBP>140  -check orthostatic BP once pt able to get up prn  -VS q shift and prn      Cough  CXR with small left basilar opacity c/w atelectasis/scarring  Lungs clear on exam  No cough noted this am     Altered mental status, unspecified altered mental status type  -suspect 2/2 infection, dehydration  Head CT negative  -more awake, alert but overall very tired and overall very weak  -unclear of baseline but appears oriented x 3 but very forgetful and poor historian and no longer  lethargic in appearance      Ankle pain, left- improved  Xray 5/31/24 neg for fracture; +soft tissue swelling  Pt denies pain.  No tenderness on exam  -left ankle with dressing in place   -Lidoderm patch 5% prn , doesn't want it currently      ALMA DELIA on CKD stage 3a  Baseline Cr 0.8; on admission 1.12  Creat normalized 1.12 > 0.81 with IVF's (now stopped)  - Cr today 0.79     Fatigue- improved   -chronic issue.  Likely multifactorial in the setting of aging, urinary incontinence leading to nocturia up to 4 times a night, poor p.o. intake.     Urinary incontinence  Has been requiring the use of pads, most prominent at nighttime.  - Stopped oxybutinin as was not helping     Joint stiffness  Localized to both hands, ongoing for quite some time.  Suspect osteoarthritis as no evidence of tenosynovitis on my examination  - Check x-ray of the bilateral hands: Prior x-rays 5/2022 did demonstrate osteoarthritis  - BATSHEVA was 1: 80, though overall low suspicion for rheumatological disease  - Can perform home exercises in addition to continuing with Tylenol on an as-needed basis for symptom support  -cont lidoderm patch 4% to back of neck q 12 hrs   -gabapentin 100mg po q 8 prn      Coronary artery disease  Hx of Calcium score > 400, followed with Dr. Jimenez, last seen 2/24/2022  -Dr. Jimenez retired; pt has not yet established with  new cardiologist  -on lipitor  -pt has declined aspirin therapy  -weekly cbc and bmp in rehab      Hypothyroidism  - no longer on liothyronine  - Levothyroxine 88 mcg po daily   -TSH 1.54 >0.384  - repeat TSH in 3 mos (9/2024)     Chronic L1 compression fracture  - On home gabapentin 100 mg TID PRN  - Stable on current regimen     Depression/mood disorder  -Effexor 37.5 mg po daily   -cont Seroquel 25mg po q hs  .  -not participating well in conversation , forgetful , reports no energy even to speak  -Psych to see in rehab      HLD  -Cont atorvastatin 20mg po q day        Active tobacco use  - Evidence of emphysema on CT calcium score 2/24/2022  -Discussed the need to stop smoking altogether, aware of the risk for lung cancer, COPD  -Recommended gradual weaning off of cigarettes by decreasing 1-2 cigarettes every 1-2 weeks.  -We also discussed nicotine replacement therapy  - unclear what she is  smoking  daily . Was about 7 cigarettes/day before admission                   Consultants           Provider   Role Specialty      Eduarda Bajwa MD       Consulting Physician Internal Medicine               This is a 25 minute visit and greater than 50% of the time was spent counseling the patient and/or coordinating care.    Stacy Dunlap, APRN  06/18/24   4:22 PM

## 2024-06-19 ENCOUNTER — EXTERNAL FACILITY (OUTPATIENT)
Dept: INTERNAL MEDICINE CLINIC | Facility: CLINIC | Age: 84
End: 2024-06-19

## 2024-06-19 DIAGNOSIS — I95.1 ORTHOSTATIC HYPOTENSION: ICD-10-CM

## 2024-06-19 DIAGNOSIS — R53.1 WEAKNESS GENERALIZED: Primary | ICD-10-CM

## 2024-06-19 PROCEDURE — 99307 SBSQ NF CARE SF MDM 10: CPT | Performed by: INTERNAL MEDICINE

## 2024-06-24 ENCOUNTER — SNF VISIT (OUTPATIENT)
Dept: INTERNAL MEDICINE CLINIC | Facility: SKILLED NURSING FACILITY | Age: 84
End: 2024-06-24

## 2024-06-24 VITALS
HEART RATE: 71 BPM | BODY MASS INDEX: 25 KG/M2 | RESPIRATION RATE: 18 BRPM | SYSTOLIC BLOOD PRESSURE: 150 MMHG | WEIGHT: 145.5 LBS | DIASTOLIC BLOOD PRESSURE: 77 MMHG | OXYGEN SATURATION: 98 % | TEMPERATURE: 97 F

## 2024-06-24 DIAGNOSIS — Z75.8 DISCHARGE PLANNING ISSUES: Primary | ICD-10-CM

## 2024-06-24 DIAGNOSIS — R41.82 ALTERED MENTAL STATUS, UNSPECIFIED ALTERED MENTAL STATUS TYPE: ICD-10-CM

## 2024-06-24 DIAGNOSIS — R53.1 GENERALIZED WEAKNESS: ICD-10-CM

## 2024-06-24 DIAGNOSIS — D64.9 MILD ANEMIA: ICD-10-CM

## 2024-06-24 DIAGNOSIS — N39.0 URINARY TRACT INFECTION WITHOUT HEMATURIA, SITE UNSPECIFIED: ICD-10-CM

## 2024-06-24 NOTE — PROGRESS NOTES
S[place] Still  : 1940  Age 84 year old  female patient is admitted to   [facility] 6/3/24 for rehab and strengthening      Chief complaint: : Acute cystitis without hematuria ,Altered mental status     [facility] Admission : 24 to 6/3/24     HPI   83 yo resident of [facility] presented to [facility] ED 24  with altered mental status and weakness. Pt didn't  exactly recall why she came in for evaluation but did  report having worsening cough the past few days. Cough is nonproductive. She denied fever or change in appetite.  She was treated with empiric antibiotics with urine culture revealing Aerococcus urinae.  She gradually responded to medical therapy.  She  returned to baseline mental status.  Blood pressure medications including the suffused for orthostatic hypotension were carefully titrated.  Midodrine was discontinued  in favor of fludrocortisone given supine hypertension.  PT/OT recommended subacute rehab.  Patient discharged to [facility] for continued conditioning and monitoring.      Patient seen in follow up , vss, no reported fevers or chills.  Seen sitting in  room ,waiting for her meal, reports doing overall doing  better , would like to go back home to [facility] when completes therapy .  Patient reports overall weakness improving slowly  and reports  participating well in therapy, reports needing  some more time in therapy  .  Energy level  improving , oriented x 3 but forgetful , poor historian . Seen participating in therapy and trying. Speech advanced diet to general regular diet , mechanical soft texture   and thin liquids .Patient does not have any teeth ( no dentures) , she reports she can eat most foods but need to be soft , no nuts chips etc.  Lungs clear, HR regular. Denies pain or discomfort. Reports no other concerns or issues.         ADVANCED CARE PLANNING TEAM: family care plan , had been living at [facility] , dc plan 24 back to [facility]        ALLERGIES:No Known Allergies    CODE STATUS:  DNAR/SELECT    CURRENT MEDICATIONS - Reviewed and updated     Current Outpatient Medications   Medication Sig Dispense Refill    lidocaine-menthol 4-1 % External Patch Place 1 patch onto the skin daily. 30 patch 0    vancomycin 125 MG Oral Cap Take 1 capsule (125 mg total) by mouth daily.      QUEtiapine 25 MG Oral Tab Take 1 tablet (25 mg total) by mouth nightly. 30 tablet 0    venlafaxine ER 37.5 MG Oral Capsule SR 24 Hr Take 1 capsule (37.5 mg total) by mouth daily. 90 capsule 3    levothyroxine 88 MCG Oral Tab Take 1 tablet (88 mcg total) by mouth before breakfast. 90 tablet 3    ATORVASTATIN 20 MG Oral Tab Take 1 tablet (20 mg total) by mouth daily. 90 tablet 3    FLUDROCORTISONE 0.1 MG Oral Tab Take 1 tablet (0.1 mg total) by mouth daily. 90 tablet 3    gabapentin 100 MG Oral Cap TAKE 1 CAPSULE BY MOUTH THREE TIMES DAILY AS NEEDED FOR PAIN 100 capsule 3       VITALS:  /77   Pulse 71   Temp 97.1 °F (36.2 °C)   Resp 18   Wt 145 lb 8 oz (66 kg)   SpO2 98%   BMI 24.98 kg/m²       REVIEW OF SYSTEMS:  GENERAL HEALTH:feels well otherwise  SKIN: denies any unusual skin lesions or rashes  WOUNDS: none  EYES:no visual complaints or deficits  HENT: denies nasal congestion, sinus pain or sore throat;  RESPIRATORY: denies shortness of breath, wheezing or cough   CARDIOVASCULAR:denies chest pain, no palpitations   GI: denies nausea, vomiting, constipation, diarrhea; no rectal bleeding; no heartburn  :no dysuria, urgency or frequency; no vaginal discharge; no urinary incontinence; no hematuria  MUSCULOSKELETAL:generalized weakness   NEURO:no sensory or motor complaint  PSYCHE: no symptoms of depression or anxiety  HEMATOLOGY:denies hx anemia  ENDOCRINE: denies excessive thirst or urination; denies unexpected wt gain or wt loss  ALLERGY/IMM.: denies food or seasonal allergies        PHYSICAL EXAM:  GENERAL HEALTH: frail 85 y/o with overall weakness but improving,  seen participating in therapy in gym   LINES, TUBES, DRAINS:  none  SKIN: no rashes, no suspicious lesions  WOUND: none  EYES: PERRLA, EOMI, sclera anicteric, conjunctiva normal; there is no nystagmus, no drainage from eyes  HENT: normocephalic; normal nose, no nasal drainage, mucous membranes pink, moist, pharynx no exudate, no visible cerumen.  NECK: supple; FROM; no JVD, no TMG, no carotid bruits  BREAST: deferred  RESPIRATORY:clear to percussion and auscultation  CARDIOVASCULAR: S1, S2 normal, RRR; no S3, no S4;   ABDOMEN:  normal active BS+, soft, nondistended; no organomegaly, no masses; no bruits; nontender, no guarding, no rebound tenderness.  :no suprapubic distension  LYMPHATIC:no lymphedema  MUSCULOSKELETAL: no acute synovitis upper or lower extremity  EXTREMITIES/VASCULAR:no cyanosis, clubbing or edema  NEUROLOGIC: follows commands  PSYCHIATRIC: alert and oriented x 3; forgetful      SEE PLAN BELOW  Acute cystitis without hematuria- resolved   UA +WBCs  Urine cx -- 5/31 aerococus urinae  Blood cx NGTD  Lactic acid normal  Initial WBC 15.4 >  now normalized  -day 3 ceftriaxone -- stopped 6/2, switched to Amoxicillin  500 mg po tid for a total of 7 days Abx. Completed  6/6/2024  - dc  vanco 125mg po daily proph 6/13/24     Generalized weakness- improving  -pt was apparently ambulatory at home w/a walker  -currently max assist with PT  -cont PT/OT     Mild anemia- stable   -baseline Hgb in the 12's  -Hgb 13.3 on admission, then 11.9 >11.8 -- suspect due to hemodilution (pt was dry on admission)  -check B12 (normal in 8/2023) - close to normal range, iron studies suggestive of chronic inflammation  -weekly cbc and bmp in rehab      Orthostatic hypotension- stable   -on fludrocortisone and Midodrine 5mg BID (increased from 2.5mg BID in 8/2023)  -BP has been running high in past 2 days (140's-170's systolic) so midodrine not given  -will stop midodrine for now  -hold fludrocortisone 0.1 mg po q day for  SBP>140  -check orthostatic BP once pt able to get up prn  -VS q shift and prn      Cough- resolved   CXR with small left basilar opacity c/w atelectasis/scarring  Lungs clear on exam  No cough noted this am     Altered mental status, unspecified altered mental status type- improving   -suspect 2/2 infection, dehydration  Head CT negative  -more awake, alert but overall very tired and overall very weak  -unclear of baseline but appears oriented x 3 but very forgetful and poor historian and no longer  lethargic in appearance      Ankle pain, left- improved  Xray 5/31/24 neg for fracture; +soft tissue swelling  Pt denies pain.  No tenderness on exam  -left ankle with dressing in place   -Lidoderm patch 5% prn , doesn't want it currently      ALMA DELIA on CKD stage 3a  Baseline Cr 0.8; on admission 1.12  Creat normalized 1.12 > 0.81 with IVF's (now stopped)  - Cr today 0.79     Fatigue- improved   -chronic issue.  Likely multifactorial in the setting of aging, urinary incontinence leading to nocturia up to 4 times a night, poor p.o. intake.     Urinary incontinence  Has been requiring the use of pads, most prominent at nighttime.  - Stopped oxybutinin as was not helping     Joint stiffness- improving   Localized to both hands, ongoing for quite some time.  Suspect osteoarthritis as no evidence of tenosynovitis on my examination  - Check x-ray of the bilateral hands: Prior x-rays 5/2022 did demonstrate osteoarthritis  - BATSHEVA was 1: 80, though overall low suspicion for rheumatological disease  - Can perform home exercises in addition to continuing with Tylenol on an as-needed basis for symptom support  -cont lidoderm patch 4% to back of neck q 12 hrs   -gabapentin 100mg po q 8 prn      Coronary artery disease  Hx of Calcium score > 400, followed with Dr. Jimenez, last seen 2/24/2022  -Dr. Jimenez retired; pt has not yet established with new cardiologist  -on lipitor  -pt has declined aspirin therapy  -weekly cbc and bmp in rehab       Hypothyroidism  - no longer on liothyronine  - Levothyroxine 88 mcg po daily   -TSH 1.54 >0.384  - repeat TSH in 3 mos (9/2024)     Chronic L1 compression fracture  - On home gabapentin 100 mg TID PRN  - Stable on current regimen     Depression/mood disorder  -Effexor 37.5 mg po daily   -cont Seroquel 25mg po q hs  .  -not participating well in conversation , forgetful , reports no energy even to speak  -Psych to see in rehab      HLD  -Cont atorvastatin 20mg po q day        Active tobacco use  - Evidence of emphysema on CT calcium score 2/24/2022  -Discussed the need to stop smoking altogether, aware of the risk for lung cancer, COPD  -Recommended gradual weaning off of cigarettes by decreasing 1-2 cigarettes every 1-2 weeks.  -We also discussed nicotine replacement therapy  - unclear what she is  smoking  daily . Was about 7 cigarettes/day before admission                   Consultants           Provider   Role Specialty      Eduarda Bajwa MD       Consulting Physician Internal Medicine             This is a 35 minute visit and greater than 50% of the time was spent counseling the patient and/or coordinating care.    Stacy Dunlap, RADHA  06/24/24   12:46 PM

## 2024-06-26 ENCOUNTER — EXTERNAL FACILITY (OUTPATIENT)
Dept: INTERNAL MEDICINE CLINIC | Facility: CLINIC | Age: 84
End: 2024-06-26

## 2024-06-26 DIAGNOSIS — I95.1 ORTHOSTATIC HYPOTENSION: ICD-10-CM

## 2024-06-26 DIAGNOSIS — S32.010S CLOSED COMPRESSION FRACTURE OF L1 VERTEBRA, SEQUELA: ICD-10-CM

## 2024-06-26 DIAGNOSIS — R53.1 WEAKNESS GENERALIZED: Primary | ICD-10-CM

## 2024-06-26 PROCEDURE — 99307 SBSQ NF CARE SF MDM 10: CPT | Performed by: INTERNAL MEDICINE

## 2024-06-28 ENCOUNTER — SNF DISCHARGE (OUTPATIENT)
Dept: INTERNAL MEDICINE CLINIC | Facility: SKILLED NURSING FACILITY | Age: 84
End: 2024-06-28

## 2024-06-28 DIAGNOSIS — R05.1 ACUTE COUGH: ICD-10-CM

## 2024-06-28 DIAGNOSIS — E03.9 ACQUIRED HYPOTHYROIDISM: ICD-10-CM

## 2024-06-28 DIAGNOSIS — F33.1 MAJOR DEPRESSIVE DISORDER, RECURRENT EPISODE, MODERATE (HCC): ICD-10-CM

## 2024-06-28 DIAGNOSIS — N30.00 ACUTE CYSTITIS WITHOUT HEMATURIA: Primary | ICD-10-CM

## 2024-06-28 DIAGNOSIS — I95.1 ORTHOSTATIC HYPOTENSION: ICD-10-CM

## 2024-06-28 DIAGNOSIS — E78.49 OTHER HYPERLIPIDEMIA: ICD-10-CM

## 2024-06-28 PROCEDURE — 99316 NF DSCHRG MGMT 30 MIN+: CPT | Performed by: NURSE PRACTITIONER

## 2024-06-28 NOTE — PROGRESS NOTES
[place], 4/1/1940, 84 year old, female is being discharged from [facility] to [place]       DISCHARGE SUMMARY    Date of Admission to [facility]: 06/03/24    Date of Discharge from [facility]: 06/28/24                     Admitting Diagnoses:  Acute cystitis without hematuria ,Altered mental status     Reason for Admission to [facility]: Rehabilitation and strengthening      PHYSICAL EXAM:  GENERAL HEALTH: frail 83 y/o with overall weakness but improving, seen participating in therapy in gym   LINES, TUBES, DRAINS:  none  SKIN: no rashes, no suspicious lesions  WOUND: none  EYES: PERRLA, EOMI, sclera anicteric, conjunctiva normal; there is no nystagmus, no drainage from eyes  HENT: normocephalic; normal nose, no nasal drainage, mucous membranes pink, moist, pharynx no exudate, no visible cerumen.  NECK: supple; FROM; no JVD, no TMG, no carotid bruits  BREAST: deferred  RESPIRATORY:clear to percussion and auscultation  CARDIOVASCULAR: S1, S2 normal, RRR; no S3, no S4;   ABDOMEN:  normal active BS+, soft, nondistended; no organomegaly, no masses; no bruits; nontender, no guarding, no rebound tenderness.  :no suprapubic distension  LYMPHATIC:no lymphedema  MUSCULOSKELETAL: no acute synovitis upper or lower extremity  EXTREMITIES/VASCULAR:no cyanosis, clubbing or edema  NEUROLOGIC: follows commands  PSYCHIATRIC: alert and oriented x 3; forgetful      SEE PLAN BELOW  Acute cystitis without hematuria- resolved   UA +WBCs  Urine cx -- 5/31 aerococus urinae  Blood cx NGTD  Lactic acid normal  Initial WBC 15.4 >  now normalized  -day 3 ceftriaxone -- stopped 6/2, switched to Amoxicillin  500 mg po tid for a total of 7 days Abx. Completed  6/6/2024  - dc  vanco 125mg po daily proph 6/13/24     Generalized weakness- improving  -pt was apparently ambulatory at home w/a walker  -currently max assist with PT  -cont PT/OT     Mild anemia- stable   -baseline Hgb in the 12's  -Hgb 13.3 on admission, then 11.9 >11.8  -- suspect due to hemodilution (pt was dry on admission)  -check B12 (normal in 8/2023) - close to normal range, iron studies suggestive of chronic inflammation  -weekly cbc and bmp in rehab      Orthostatic hypotension- stable   -on fludrocortisone and Midodrine 5mg BID (increased from 2.5mg BID in 8/2023)  -BP has been running high in past 2 days (140's-170's systolic) so midodrine not given  -will stop midodrine for now  -hold fludrocortisone 0.1 mg po q day for SBP>140  -check orthostatic BP once pt able to get up prn  -VS q shift and prn      Cough- resolved   CXR with small left basilar opacity c/w atelectasis/scarring  Lungs clear on exam  No cough noted this am     Altered mental status, unspecified altered mental status type- improving   -suspect 2/2 infection, dehydration  Head CT negative  -more awake, alert but overall very tired and overall very weak  -unclear of baseline but appears oriented x 3 but very forgetful and poor historian and no longer  lethargic in appearance      Ankle pain, left- improved  Xray 5/31/24 neg for fracture; +soft tissue swelling  Pt denies pain.  No tenderness on exam  -left ankle with dressing in place   -Lidoderm patch 5% prn , doesn't want it currently      ALMA DELIA on CKD stage 3a  Baseline Cr 0.8; on admission 1.12  Creat normalized 1.12 > 0.81 with IVF's (now stopped)  - Cr today 0.79     Fatigue- improved   -chronic issue.  Likely multifactorial in the setting of aging, urinary incontinence leading to nocturia up to 4 times a night, poor p.o. intake.     Urinary incontinence  Has been requiring the use of pads, most prominent at nighttime.  - Stopped oxybutinin as was not helping     Joint stiffness- improving   Localized to both hands, ongoing for quite some time.  Suspect osteoarthritis as no evidence of tenosynovitis on my examination  - Check x-ray of the bilateral hands: Prior x-rays 5/2022 did demonstrate osteoarthritis  - BATSHEVA was 1: 80, though overall low suspicion  for rheumatological disease  - Can perform home exercises in addition to continuing with Tylenol on an as-needed basis for symptom support  -cont lidoderm patch 4% to back of neck q 12 hrs   -gabapentin 100mg po q 8 prn      Coronary artery disease  Hx of Calcium score > 400, followed with Dr. iJmenez, last seen 2/24/2022  -Dr. Jimenez retired; pt has not yet established with new cardiologist  -on lipitor  -pt has declined aspirin therapy  -weekly cbc and bmp in rehab      Hypothyroidism  - no longer on liothyronine  - Levothyroxine 88 mcg po daily   -TSH 1.54 >0.384  - repeat TSH in 3 mos (9/2024)     Chronic L1 compression fracture  - On home gabapentin 100 mg TID PRN  - Stable on current regimen     Depression/mood disorder  -Effexor 37.5 mg po daily   -cont Seroquel 25mg po q hs  .  -not participating well in conversation , forgetful , reports no energy even to speak  -Psych to see in rehab      HLD  -Cont atorvastatin 20mg po q day        Active tobacco use  - Evidence of emphysema on CT calcium score 2/24/2022  -Discussed the need to stop smoking altogether, aware of the risk for lung cancer, COPD  -Recommended gradual weaning off of cigarettes by decreasing 1-2 cigarettes every 1-2 weeks.  -We also discussed nicotine replacement therapy  - unclear what she is  smoking  daily . Was about 7 cigarettes/day before admission                 Medication Reconciliation Completed:  Yes - All medications and side effects reviewed with patient. ALL medications including narcotics to be sent to [place]    FOLLOW UP APPOINTMENTS  No future appointments.     This is a 35 minute visit and greater than 50% of the time was spent counseling the patient and/or coordinating care.    Note to patient: The 21st Century Cures Act makes medical notes like these available to patients in the interest of transparency. However, this is a medical document intended as peer to peer communication. It is written in medical language and may contain  abbreviations or verbiage that are unfamiliar. It may appear blunt or direct. Medical documents are intended to carry relevant information, facts as evident, and the clinical opinion of the practitioner who signs the document.     Cici Low, APRN  6/28/2024

## 2024-07-14 NOTE — PROGRESS NOTES
Location [facility]  Date of service June 12, 2024    I saw patient in the rehab as a follow-up.  She looks more energetic compared to my first visit.  She has completed antibiotics.  No more recurrence of fever.  Denies any dysuria.      Physical exam  Vital signs reviewed  No distress noted  Neck supple  Chest bilateral breath sounds no rhonchi  CV  S1-S2 normal regular rate and rhythm  Abdomen-soft nontender bowel sounds present  Alert orient x 3.  Cooperative    Assessment and plan    Altered mental status-resolved    Acute cystitis-completed antibiotics    Generalized weakness most likely from hospitalization and cystitis continue PT OT-getting stronger    Orthostatic hypotension-monitor blood pressure-hold midodrine and fludrocortisone if blood pressures running high.  Follow-up as outpatient.    Anemia-monitor hemoglobin in the rehab-hemoglobin is normal now    Acute renal failure-resolved in the hospital.-Creatinine is normal in the rehab    Hypothyroidism-continue supplementation    Depression/mood disorder-stable follow-up as outpatient    Chronic compression fracture/generalized arthralgia-continue gabapentin continue physical therapy continue Lidoderm patch may need outpatient follow-up    Plan  Antibiotics completed  CBC CMP reviewed from June 6  Optimize pain and continue physical therapy  Progressing well.

## 2024-07-14 NOTE — PROGRESS NOTES
Location [facility]  Date of service June 19, 2024     Progressing well.  Denies any chest pain or shortness of breath.  Denies abdominal pain nausea vomiting.  No fever no chills reported.      Physical exam  Vital signs reviewed  No distress noted  Neck supple  Chest bilateral breath sounds no rhonchi  CV  S1-S2 normal regular rate and rhythm  Abdomen-soft nontender bowel sounds present  Alert orient x 3.  Cooperative     Assessment and plan     Altered mental status-resolved     Acute cystitis-completed antibiotics     Generalized weakness most likely from hospitalization and cystitis continue PT OT-progressing well     Orthostatic hypotension-monitor blood pressure-hold midodrine and fludrocortisone if blood pressures running high.  Follow-up as outpatient.     Anemia-monitor hemoglobin in the rehab-hemoglobin is normal now     Acute renal failure-resolved in the hospital.-Creatinine is normal in the rehab     Hypothyroidism-continue supplementation     Depression/mood disorder-stable follow-up as outpatient     Chronic compression fracture/generalized arthralgia-continue gabapentin continue physical therapy continue Lidoderm patch may need outpatient follow-up     Plan  Progressing well.  Optimize pain and continue physical therapy  Continue same medications

## 2024-07-14 NOTE — PROGRESS NOTES
Admission summary  Location [facility]  Date of service June 5, 2024    84-year-old lady with medical history significant for hypothyroidism, compression fracture, depression/mood disorder, atherosclerotic disease was brought into the emergency room for weakness and mental status change.  She was found to have acute cystitis, treated with antibiotics, mental status got better.  Because of her fatigue and weakness physical therapy recommended subacute rehabilitation.  Patient was also found to have orthostatic in the hospital.  Her condition got stabilized and was subsequently transferred to [facility] for rehabilitation.  I saw her in [facility].  She was resting in the bed comfortably.  She denied any chest pain or shortness of breath.  She looked very fatigued.  Denies any dysuria or hematuria.    History copied from [facility] records      Past Medical History       Past Medical History:    Abdominal aortic aneurysm (AAA) without rupture (HCC)    Acquired hypothyroidism    Agatston CAC score, >400    Arthritis    Atherosclerosis of aorta (HCC)    Chronic bronchitis (HCC)    Closed compression fracture of L1 vertebra (HCC)    Depression, recurrent (HCC)    Hypothyroid    Orthostatic hypertension         Past Surgical History         Past Surgical History:   Procedure Laterality Date    Appendectomy        Hip replacement surgery Right      Hysterectomy        Other surgical history   4/16/16      SBO         Family History         Family History   Problem Relation Age of Onset    Stroke Mother      Schizophrenia Daughter      Other (hodkins lymphoma) Son           Short Social Hx on File   Social History            Socioeconomic History    Marital status:    Tobacco Use    Smoking status: Every Day       Current packs/day: 0.25       Types: Cigarettes    Smokeless tobacco: Never    Tobacco comments:       4-5; 60 years   Vaping Use    Vaping status: Never Used   Substance and Sexual Activity    Alcohol use: No    Drug use: No     Sexual activity: Never      Social Determinants of Health           Food Insecurity: No Food Insecurity (6/1/2024)     Food Insecurity      Food Insecurity: Never true   Transportation Needs: No Transportation Needs (6/1/2024)     Transportation Needs      Lack of Transportation: No   Housing Stability: Low Risk  (6/1/2024)     Housing Stability      Housing Instability: No            ALLERGIES:  Allergies   No Known Allergies        CODE STATUS:  DNAR /SELECT     ADVANCED CARE PLANNING TEAM: will need family care plan , had been  living  at [facility]      Review of systems  No fever no chills  No cough or hemoptysis  No chest pain or shortness of breath  No abdominal pain nausea vomiting  No hematuria or dysuria  Reports arthralgia and fatigue    Physical exam  Vital signs reviewed  Alert orient x 2-3-she knew she was in rehab.  Anicteric sclera  Neck supple  Chest bilateral breath sounds no rhonchi  CV  S1-S2 normal regular rate and rhythm  Abdomen-soft nontender bowel sounds present  Alert orient x 2-3-fatigue looking    Assessment and plan    Altered mental status-resolved/resolving-most likely from acute cystitis    Acute cystitis-continue antibiotics.    Generalized weakness most likely from hospitalization and cystitis continue PT OT    Orthostatic hypotension-monitor blood pressure-hold midodrine and fludrocortisone if blood pressures running high.  Follow-up as outpatient.    Anemia-monitor hemoglobin in the rehab    Acute renal failure-resolved in the hospital.    Hypothyroidism-continue supplementation    Depression/mood disorder-stable follow-up as outpatient    Chronic compression fracture/generalized arthralgia-continue gabapentin continue physical therapy continue Lidoderm patch may need outpatient follow-up    Plan  Continue antibiotics  Monitor hemoglobin  Optimize pain and continue physical therapy  Fall precautions, aspiration precautions, decubitus prevention as per rehab protocol  I have  informed staff that patient looks very fatigued to me.  If there is no improvement, may need to have conversation with the family regarding level of care

## 2024-07-14 NOTE — PROGRESS NOTES
Location [facility]  Date of service June 26, 2024     Overall doing okay.  Eating better.  Energy improved.  Looking forward to going back to [facility] soon.  I discussed with her regarding fall preventions.    Physical exam  Vital signs reviewed  No distress noted  Neck supple  Chest bilateral breath sounds no rhonchi  CV  S1-S2 normal regular rate and rhythm  Abdomen-soft nontender bowel sounds present  Alert orient x 3.  Cooperative     Assessment and plan     Generalized weakness most likely from hospitalization and cystitis continue PT OT-progressing well-discharge planning in progress     Orthostatic hypotension-monitor blood pressure-hold midodrine and fludrocortisone if blood pressures running high.  Follow-up as outpatient.  Needs outpatient follow-up with Dr. Telly lux     Anemia-monitor hemoglobin in the rehab-hemoglobin is normal now     Acute renal failure-resolved in the hospital.-Creatinine is normal in the rehab     Hypothyroidism-continue supplementation     Depression/mood disorder-stable follow-up as outpatient     Chronic compression fracture/generalized arthralgia-continue gabapentin continue physical therapy continue Lidoderm patch may need outpatient follow-up    Altered mental status-resolved     Acute cystitis-completed antibiotics     Plan  Progressing well.  Optimize pain and continue physical therapy  Continue same medications  Discharge planning in progress  Labs reviewed from June 25  Follow-up with rheumatology for generalized arthralgia  Follow-up with the PCP in 1 week  Reevaluate orthostatic hypotension  Discussed fall prevention with the patient

## (undated) NOTE — IP AVS SNAPSHOT
Patient Demographics     Address  [address] [place] [id number] Phone  [phone] (Home)  [phone] (Mobile) *Preferred* E-mail Address  [email or web address]      Patient Contacts     Name Relation Home Work Mobile    Timothy Fernandez Grandparent   [phone]      Allergies as of 6/3/2024  Review status set to Review Complete on 5/31/2024   No Known Allergies     Code Status Information     Code Status    DNAR/Selective Treatment        Patient Instructions       You were seen in the hospital for weakness and fatigue. Based on exam, this was attributed to urinary tract infection with ongoing orthostatic hypotension. You responded well to antibiotics with improvement of your symptoms  - We carefully managed your blood pressures with managing your orthostatic/low blood pressures  - You were recommended to continue rehab at a subacute rehab  facility    Please follow-up with Dr. Li in 1-2 weeks for post-hospital follow-up    Medication changes on discharge:  -will stop midodrine for now  -hold fludrocortisone for SBP>140     Follow-up Information     Telly Li MD. Schedule an appointment as soon as possible for a visit.    Specialty: Internal Medicine  Why: Post-hosptial follow-up in 1-2 weeks  Contact information:  [address] [place], [place] [id number]  [phone]                        Your Home Meds List      TAKE these medications       Instructions Authorizing Provider Morning Afternoon Evening As Needed   amoxicillin 500 MG Caps  Commonly known as: Amoxil  Next dose due: Tonight       Take 1 capsule (500 mg total) by mouth in the morning, at noon, and at bedtime for 5 days.  Stop taking on: June 8, 2024   Telly Li         atorvastatin 20 MG Tabs  Commonly known as: Lipitor  Next dose due: Tomorrow 9am      Take 1 tablet (20 mg total) by mouth daily.   Telly Li         fludrocortisone 0.1 MG Tabs  Commonly known as: Florinef  Next dose due: Tomorrow 9am      Take 1  tablet (0.1 mg total) by mouth daily.   Telly Li         gabapentin 100 MG Caps  Commonly known as: Neurontin      TAKE 1 CAPSULE BY MOUTH THREE TIMES DAILY AS NEEDED FOR PAIN   Heather Hernandez         levothyroxine 88 MCG Tabs  Commonly known as: Synthroid  Next dose due: Tomorrow 6am      Take 1 tablet (88 mcg total) by mouth before breakfast.   Telly Li         lidocaine-menthol 4-1 % Ptch  Next dose due: Tomorrow 8ma      Place 1 patch onto the skin daily.   Janak Esparza         QUEtiapine 25 MG Tabs  Commonly known as: SEROquel  Next dose due: Tonight 9pm      Take 1 tablet (25 mg total) by mouth nightly.   Telly Li         vancomycin 125 MG Caps  Commonly known as: Vancocin  Next dose due: Tomorrow 8a      Take 1 capsule (125 mg total) by mouth daily.   Janak Esparza         venlafaxine ER 37.5 MG Cp24  Commonly known as: Effexor-XR  Next dose due: Tomorrow 8am      Take 1 capsule (37.5 mg total) by mouth daily.   Telly Li               Where to Get Your Medications      Please  your prescriptions at the location directed by your doctor or nurse    Bring a paper prescription for each of these medications  amoxicillin 500 MG Caps           [phone] - MAR ACTION REPORT  (last 48 hrs)    ** SITE UNKNOWN **     Order ID Medication Name Action Time Action Reason Comments    [id number] QUEtiapine (SEROquel) tab 25 mg 06/01/24 2038 Given      [id number] QUEtiapine (SEROquel) tab 25 mg 06/02/24 2108 Given      [id number] amoxicillin (Amoxil) cap 500 mg 06/02/24 1818 Given      [id number] amoxicillin (Amoxil) cap 500 mg 06/03/24 0001 Given      [id number] amoxicillin (Amoxil) cap 500 mg 06/03/24 0823 Given      [id number] atorvastatin (Lipitor) tab 20 mg 06/02/24 0846 Given      [id number] atorvastatin (Lipitor) tab 20 mg 06/03/24 0823 Given      [id number] fludrocortisone (Florinef) tab 0.1 mg 06/02/24 0846 Given      [id number] fludrocortisone (Florinef) tab 0.1 mg 06/03/24 0823 Given      [id number]  levothyroxine (Synthroid) tab 75 mcg 06/03/24 0518 Given      [id number] vancomycin (Vancocin) cap 125 mg 06/02/24 0846 Given      [id number] vancomycin (Vancocin) cap 125 mg 06/03/24 0823 Given      [id number] venlafaxine ER (Effexor-XR) 24 hr cap 37.5 mg 06/02/24 0846 Given      [id number] venlafaxine ER (Effexor-XR) 24 hr cap 37.5 mg 06/03/24 0823 Given            LEFT LOWER ABDOMEN     Order ID Medication Name Action Time Action Reason Comments    [id number] heparin (Porcine) 5000 UNIT/ML injection 5,000 Units 06/01/24 2038 Given      [id number] heparin (Porcine) 5000 UNIT/ML injection 5,000 Units 06/02/24 0846 Given      [id number] heparin (Porcine) 5000 UNIT/ML injection 5,000 Units 06/03/24 0823 Given            MID BACK     Order ID Medication Name Action Time Action Reason Comments    [id number] lidocaine-menthol 4-1 % patch 1 patch 06/03/24 0823 Patch Applied            RIGHT LOWER ABDOMEN     Order ID Medication Name Action Time Action Reason Comments    [id number] heparin (Porcine) 5000 UNIT/ML injection 5,000 Units 06/02/24 2108 Given      [id number] lidocaine-menthol 4-1 % patch 1 patch 06/02/24 0846 Patch Applied              Recent Vital Signs    Flowsheet Row Most Recent Value   /77 Filed at 06/03/2024 0821   Pulse 74 Filed at 06/03/2024 0821   Resp 18 Filed at 06/03/2024 0821   Temp 98 °F (36.7 °C) Filed at 06/03/2024 0821   SpO2 94 % Filed at 06/03/2024 0821      Patient's Most Recent Weight    Flowsheet Row Most Recent Value   Patient Weight 65.5 kg (144 lb 6.4 oz)         Lab Results Last 24 Hours      Basic Metabolic Panel (8) [[id number]] (Abnormal)  Resulted: 06/03/24 0646, Result status: Final result   Ordering provider: Eduarda Bajwa MD  06/02/24 2300 Resulting lab: [facility] ([facility])    Specimen Information    Type Source Collected On   Blood — 06/03/24 0513          Components    Component Value Reference Range Flag Lab   Glucose 107 70 - 99 mg/dL [facility]  ([facility])   Sodium 143 136 - 145 mmol/L — [facility] ([facility])   Potassium 3.7 3.5 - 5.1 mmol/L — [facility] ([facility])   Chloride 111 98 - 112 mmol/L — [facility])   CO2 27.0 21.0 - 32.0 mmol/L — [facility])   Anion Gap 5 0 - 18 mmol/L — [facility])   BUN 17 9 - 23 mg/dL — [facility])   Creatinine 0.79 0.55 - 1.02 mg/dL — [facility] ([facility])   BUN/CREA Ratio 21.5 10.0 - 20.0 H [facility] ([facility])   Calcium, Total 8.7 8.7 - 10.4 mg/dL — [facility])   Calculated Osmolality 298 275 - 295 mOsm/kg H [facility] ([facility])   eGFR-Cr 74 >=60 mL/min/1.73m2 — [facility] ([facility])            CBC With Differential With Platelet [[id number]] (Abnormal)  Resulted: 06/03/24 0633, Result status: Final result   Ordering provider: Eduarda Bajwa MD  06/02/24 2300 Resulting lab: [facility] ([facility])   Narrative:  The following orders were created for panel order CBC With Differential With Platelet.  Procedure                               Abnormality         Status                     ---------                               -----------         ------                     CBC W/ DIFFERENTIAL[[id number]]          Abnormal            Final result                 Please view results for these tests on the individual orders.    Specimen Information    Type Source Collected On   Blood — 06/03/24 0513            Testing Performed By     Lab - Abbreviation Name Director Address Valid Date Range    162 - [facility] ([facility]) [facility] ([facility]) Hima [facility] [id number] 03/19/20 1442 - Present            Microbiology Results (All)     Procedure Component Value Units Date/Time    Blood Culture [[id number]] Collected: 05/31/24 0222    Order Status: Completed Lab Status: Preliminary result Updated: 06/03/24 0301    Specimen: Blood,peripheral      Blood Culture Result No Growth 3 Days    Blood Culture [[id number]] Collected: 05/31/24 0228     Order Status: Completed Lab Status: Preliminary result Updated: 06/03/24 0301    Specimen: Blood,peripheral      Blood Culture Result No Growth 3 Days    Urine Culture, Routine [[id number]]  (Abnormal) Collected: 05/31/24 0009    Order Status: Completed Lab Status: Final result Updated: 06/02/24 1021    Specimen: Urine, clean catch      Urine Culture >100,000 CFU/ML Aerococcus urinae    SARS-CoV-2/Flu A and B/RSV by PCR ([facility]) [[id number]]  (Normal) Collected: 05/31/24 1106    Order Status: Completed Lab Status: Final result Updated: 06/01/24 1244    Specimen: Other from Nares      SARS-CoV-2 (COVID-19)- ([facility]) Not Detected     Influenza A by PCR Not Detected     Influenza B by PCR Not Detected     RSV by PCR Not Detected    Narrative:      This test is intended for the simultaneous qualitative detection and differentiation of RNA from SARS-CoV-2, influenza A virus (flu A), influenza B virus (flu B), and/or Respiratory Syncytial Virus (RSV) using nares swabs or nasopharyngeal swabs collected from individuals suspected of a respiratory viral infection consistent with COVID-19.     A \"Detected\" result is considered a positive test result for influenza A, influenza B, RSV and/or COVID-19. Positive results are indicative of active infection but do not rule out bacterial infection or co-infection with other pathogens not detected by the test.    A \"Not Detected\" result for this test means that influenza A, influenza B, RSV or SARS-CoV-2 RNA was not present in the sample above the limit of detection of the assay.    An \"Inconclusive\" result for this test means that the presence or absence of influenza A, influenza B, RSV or COVID-19 viral RNA cannot be determined, and recollection and testing by a different method should be considered.     Test performed using the [employer] [facility] m Resp-4-Plex real-time reverse transcriptase (RT) polymerase chain reaction (PCR), (RT-PCR) assay on the [employer] instrument, [employer]  [address] [place] [address]     This test is being used under the Food and Drug Administration's Emergency Use Authorization.     The authorized Fact Sheet for Healthcare Providers for this assay is available upon request from the laboratory.        Pending Labs     Order Current Status    Blood Culture Preliminary result    Blood Culture Preliminary result         H&P - H&P Note      H&P signed by Stefanie Treviño DO at 2024 12:51 PM  Version 2 of 2    Author: Stefanie Treviño DO Service: Internal Medicine Author Type: Physician    Filed: 2024 12:51 PM Date of Service: 2024  6:53 AM Status: Addendum    : Stefanie Treviño DO (Physician)    Related Notes: Original Note by Stefanie Treviño DO (Physician) filed at 2024  7:12 AM       [facility]  part of [facility]    History and Physical    [place] Patient Status:  Inpatient    1940 MRN [id number]   Location [facility] 5SW/SE Attending Eduarda Bajwa MD   Hosp Day # 0 PCP Telly Li MD     Date:  2024  Date of Admission:  2024    History provided by:patient and prior records  HPI:     Chief Complaint   Patient presents with    Altered Mental Status     Ms. Still is an 83 [facility] resident of [facility] presenting with altered mental status and weakness. Pt doesn't exactly recall why she came in for evaluation but does report having worsening cough the past few days. Cough is nonproductive. She denies fever or change in appetite.   Spoke with [facility] staff. She resides in independent living. Wasn't getting up as much. Had a fall a few days ago but pt was eased to the ground. She did not hit her head at the time. Staff did not notice changes in speech or focal deficits.     Her past medical history includes orthostatic hypotension, limited mobility, chronic fatigue, urinary incontinence, cervical spondylosis, CKD stage 3, hypothyroidism, depression, smoking.   Per prior office notes,  she is only able to walk 50 ft d/t orthostatic hypotension. She utilizes a wheelchair to navigate other areas of the facility.               History     Past Medical History:    Abdominal aortic aneurysm (AAA) without rupture (HCC)    Acquired hypothyroidism    Agatston CAC score, >400    Arthritis    Atherosclerosis of aorta (HCC)    Chronic bronchitis (HCC)    Closed compression fracture of L1 vertebra (HCC)    Depression, recurrent (HCC)    Hypothyroid    Orthostatic hypertension     Past Surgical History:   Procedure Laterality Date    Appendectomy      Hip replacement surgery Right     Hysterectomy      Other surgical history  4/16/16     SBO     Family History   Problem Relation Age of Onset    Stroke Mother     Schizophrenia Daughter     Other (hodkins lymphoma) Son      Social History:  Social History     Socioeconomic History    Marital status:    Tobacco Use    Smoking status: Every Day     Current packs/day: 0.25     Types: Cigarettes    Smokeless tobacco: Never    Tobacco comments:     4-5; 60 years   Vaping Use    Vaping status: Never Used   Substance and Sexual Activity    Alcohol use: No    Drug use: No    Sexual activity: Never     Allergies/Medications:   Allergies: No Known Allergies  Medications Prior to Admission   Medication Sig    amoxicillin 500 MG Oral Cap Take 1 capsule (500 mg total) by mouth in the morning, at noon, and at bedtime.    lidocaine-menthol 4-1 % External Patch Place 1 patch onto the skin daily.    vancomycin 125 MG Oral Cap Take 1 capsule (125 mg total) by mouth daily.    QUEtiapine 25 MG Oral Tab Take 1 tablet (25 mg total) by mouth nightly.    midodrine 5 MG Oral Tab Take 1 tablet (5 mg total) by mouth 2 (two) times daily before meals.    venlafaxine ER 37.5 MG Oral Capsule SR 24 Hr Take 1 capsule (37.5 mg total) by mouth daily.    levothyroxine 88 MCG Oral Tab Take 1 tablet (88 mcg total) by mouth before breakfast.    ATORVASTATIN 20 MG Oral Tab Take 1 tablet (20 mg  total) by mouth daily.    FLUDROCORTISONE 0.1 MG Oral Tab Take 1 tablet (0.1 mg total) by mouth daily.    gabapentin 100 MG Oral Cap TAKE 1 CAPSULE BY MOUTH THREE TIMES DAILY AS NEEDED FOR PAIN       Review of Systems:     Constitutional:  Positive for fatigue. Negative for activity change, appetite change, chills and fever.   HENT: Negative.     Respiratory:  Positive for cough. Negative for shortness of breath and wheezing.    Cardiovascular: Negative.    Gastrointestinal: Negative.    Genitourinary: Negative.    Neurological:  Positive for weakness.       Physical Exam:   Vital Signs:  Blood pressure 145/69, pulse 80, temperature 98.6 °F (37 °C), temperature source Oral, resp. rate 16, weight 144 lb 6.4 oz (65.5 kg), SpO2 96%, not currently breastfeeding.  Physical Exam  Constitutional:       Appearance: Normal appearance.   HENT:      Mouth/Throat:      Mouth: Mucous membranes are moist.   Eyes:      Extraocular Movements: Extraocular movements intact.      Conjunctiva/sclera: Conjunctivae normal.      Pupils: Pupils are equal, round, and reactive to light.   Cardiovascular:      Rate and Rhythm: Normal rate and regular rhythm.      Heart sounds: No murmur heard.  Pulmonary:      Effort: Pulmonary effort is normal. No respiratory distress.      Breath sounds: Normal breath sounds.      Comments: cough  Abdominal:      General: Abdomen is flat. There is no distension.      Palpations: Abdomen is soft.   Musculoskeletal:      Comments: L ankle tenderness to palpation over medial malleolus, slight edema   Skin:     General: Skin is warm.   Neurological:      General: No focal deficit present.      Mental Status: She is alert and oriented to person, place, and time.       Results:     Lab Results   Component Value Date    WBC 15.4 (H) 05/30/2024    HGB 13.3 05/30/2024    HCT 42.2 05/30/2024    .0 05/30/2024    CREATSERUM 1.12 (H) 05/30/2024    BUN 23 05/30/2024     05/30/2024    K 4.5 05/30/2024    CL  107 05/30/2024    CO2 27.0 05/30/2024     (H) 05/30/2024    CA 9.8 05/30/2024    ALB 4.1 05/30/2024    ALKPHO 79 05/30/2024    BILT 1.0 05/30/2024    TP 7.0 05/30/2024    AST 22 05/30/2024    ALT 14 05/30/2024    INR 1.0 10/08/2015    T4F 0.9 05/30/2024    TSH 0.390 (L) 05/30/2024    MG 2.1 09/12/2023    CK 48 05/30/2024    B12 1,861 (H) 08/17/2023    ETOH <3 05/30/2024     No results found.        Assessment/Plan:     Acute cystitis without hematuria  UA +WBCs, +LE, await urine and blood culture  Lactic acid normal  Initial WBC 15  Continue rocephin    Cough  Check cxr     Altered mental status, unspecified altered mental status type  Possibly 2/2 infection, dehydration  Head CT negative    Ankle pain  Check xray    ALMA DELIA on CKD stage 3  Baseline Cr 0.8; on admission 1.12  Gentle IVF     Orthostatic hypotension  Likely a cause of her dizziness.    - 2D echo: unremarkable  - Ongoing conservative measures at home  - C/w home fludrocortisone and Midodrine increased to 5 mg twice a day; should watch bps closely     Fatigue  Seems to be a chronic issue.  Likely multifactorial in the setting of aging, urinary incontinence leading to nocturia up to 4 times a night, poor p.o. intake.  - Chronic ongoing issue, will monitor with further resolution of UTI  - Liothyronine added for hypothyroidism     Urinary incontinence  Has been requiring the use of pads, most prominent at nighttime.  - Stop oxybutinin as not helping at this time     Neck pain  Related to a fall due to dizziness about 1 month ago.  Still some soreness in the occipital area, intermittent.  Has improved however  -Cervical x-ray did show spondylosis most prominent on at C4-C5, C5-C6, C6-C7  - Check CT neck today  - Trial of alternating Tylenol, ibuprofen.  Remained stable on current regimen.  Otherwise remained stable     Joint stiffness  Localized to both hands, ongoing for quite some time.  Suspect osteoarthritis as no evidence of tenosynovitis on my  examination  - Check x-ray of the bilateral hands: Prior x-rays 5/2022 did demonstrate osteoarthritis  - BATSHEVA was 1: 80, though overall low suspicion for rheumatological disease  - Can perform home exercises in addition to continuing with Tylenol on an as-needed basis for symptom support     CKD stage IIIa  Stable, creatinine 0.88/eGFR 65 -> 0.84/eGFR 69  - We will monitor      Coronary artery disease  Hx of Calcium score > 400, follows with Dr. Jimenez, last seen 2/24/2022  -We will need to establish with a new cardiologist given Dr. Jimenez's recent detention  - Seems to be stable     Hypothyroidism  - Repeat TFTs to monitor stability at next follow-up visit  - Levothyroxine 88 mcg  - Liothyronine added 5 mcg     Chronic L1 compression fracture  - On home gabapentin 100 mg TID PRN  - Stable on current regimen     Depression  -Effexor 37.5 mg once a day.       Active tobacco use  - Evidence of emphysema on CT calcium score 2/24/2022  -Discussed the need to stop smoking altogether, aware of the risk for lung cancer, COPD  -Recommended gradual weaning off of cigarettes by decreasing 1-2 cigarettes every 1-2 weeks.  -We also discussed nicotine replacement therapy  - Currently smoking about 7 cigarettes/day    **Certification      PHYSICIAN Certification of Need for Inpatient Hospitalization - Initial Certification    Patient will require inpatient services that will reasonably be expected to span two midnight's based on the clinical documentation in H+P.   Based on patients current state of illness, I anticipate that, after discharge, patient will require TBD.        Stefanie Treviño DO  5/31/2024      Electronically signed by Stefanie Treviño DO on 5/31/2024 12:51 PM     H&P signed by Stefanie Treviño DO at 5/31/2024  7:12 AM  Version 1 of 2    Author: Stefanie Treviño DO Service: Internal Medicine Author Type: Physician    Filed: 5/31/2024  7:12 AM Date of Service: 5/31/2024  6:53 AM Status: Signed    : Stefanie Treviño DO (Physician)     Related Notes: Addendum by Stefanie Treviño DO (Physician) filed at 2024 12:51 PM       [facility]  part of [facility]    History and Physical    [place] Patient Status:  Inpatient    1940 MRN [id number]   Location [facility] 5SW/SE Attending Eduarda Bajwa MD   Hosp Day # 0 PCP Telly Li MD     Date:  2024  Date of Admission:  2024    History provided by:patient and prior records  HPI:     Chief Complaint   Patient presents with    Altered Mental Status     Ms. Still is an 83 yo resident of [facility] presenting with altered mental status and weakness. Pt doesn't exactly recall why she came in for evaluation but does report having worsening cough the past few days. Cough is nonproductive. She denies fever or change in appetite.     Her past medical history includes orthostatic hypotension, limited mobility, chronic fatigue, urinary incontinence, cervical spondylosis, CKD stage 3, hypothyroidism, depression, smoking.   Per prior office notes, she is only able to walk 50 ft d/t orthostatic hypotension. She utilizes a wheelchair to navigate other areas of the facility.               History     Past Medical History:    Abdominal aortic aneurysm (AAA) without rupture (HCC)    Acquired hypothyroidism    Agatston CAC score, >400    Arthritis    Atherosclerosis of aorta (HCC)    Chronic bronchitis (HCC)    Closed compression fracture of L1 vertebra (HCC)    Depression, recurrent (HCC)    Hypothyroid    Orthostatic hypertension     Past Surgical History:   Procedure Laterality Date    Appendectomy      Hip replacement surgery Right     Hysterectomy      Other surgical history  16     SBO     Family History   Problem Relation Age of Onset    Stroke Mother     Schizophrenia Daughter     Other (hodkins lymphoma) Son      Social History:  Social History     Socioeconomic History    Marital status:    Tobacco Use    Smoking status: Every  Day     Current packs/day: 0.25     Types: Cigarettes    Smokeless tobacco: Never    Tobacco comments:     4-5; 60 years   Vaping Use    Vaping status: Never Used   Substance and Sexual Activity    Alcohol use: No    Drug use: No    Sexual activity: Never     Allergies/Medications:   Allergies: No Known Allergies  Medications Prior to Admission   Medication Sig    amoxicillin 500 MG Oral Cap Take 1 capsule (500 mg total) by mouth in the morning, at noon, and at bedtime.    lidocaine-menthol 4-1 % External Patch Place 1 patch onto the skin daily.    vancomycin 125 MG Oral Cap Take 1 capsule (125 mg total) by mouth daily.    QUEtiapine 25 MG Oral Tab Take 1 tablet (25 mg total) by mouth nightly.    midodrine 5 MG Oral Tab Take 1 tablet (5 mg total) by mouth 2 (two) times daily before meals.    venlafaxine ER 37.5 MG Oral Capsule SR 24 Hr Take 1 capsule (37.5 mg total) by mouth daily.    levothyroxine 88 MCG Oral Tab Take 1 tablet (88 mcg total) by mouth before breakfast.    ATORVASTATIN 20 MG Oral Tab Take 1 tablet (20 mg total) by mouth daily.    FLUDROCORTISONE 0.1 MG Oral Tab Take 1 tablet (0.1 mg total) by mouth daily.    gabapentin 100 MG Oral Cap TAKE 1 CAPSULE BY MOUTH THREE TIMES DAILY AS NEEDED FOR PAIN       Review of Systems:     Constitutional:  Positive for fatigue. Negative for activity change, appetite change, chills and fever.   HENT: Negative.     Respiratory:  Positive for cough. Negative for shortness of breath and wheezing.    Cardiovascular: Negative.    Gastrointestinal: Negative.    Genitourinary: Negative.    Neurological:  Positive for weakness.       Physical Exam:   Vital Signs:  Blood pressure 145/69, pulse 80, temperature 98.6 °F (37 °C), temperature source Oral, resp. rate 16, weight 144 lb 6.4 oz (65.5 kg), SpO2 96%, not currently breastfeeding.  Physical Exam  Constitutional:       Appearance: Normal appearance.   HENT:      Mouth/Throat:      Mouth: Mucous membranes are moist.   Eyes:       Extraocular Movements: Extraocular movements intact.      Conjunctiva/sclera: Conjunctivae normal.      Pupils: Pupils are equal, round, and reactive to light.   Cardiovascular:      Rate and Rhythm: Normal rate and regular rhythm.      Heart sounds: No murmur heard.  Pulmonary:      Effort: Pulmonary effort is normal. No respiratory distress.      Breath sounds: Normal breath sounds.      Comments: cough  Abdominal:      General: Abdomen is flat. There is no distension.      Palpations: Abdomen is soft.   Musculoskeletal:      Comments: L ankle tenderness to palpation over medial malleolus, slight edema   Skin:     General: Skin is warm.   Neurological:      General: No focal deficit present.      Mental Status: She is alert and oriented to person, place, and time.       Results:     Lab Results   Component Value Date    WBC 15.4 (H) 05/30/2024    HGB 13.3 05/30/2024    HCT 42.2 05/30/2024    .0 05/30/2024    CREATSERUM 1.12 (H) 05/30/2024    BUN 23 05/30/2024     05/30/2024    K 4.5 05/30/2024     05/30/2024    CO2 27.0 05/30/2024     (H) 05/30/2024    CA 9.8 05/30/2024    ALB 4.1 05/30/2024    ALKPHO 79 05/30/2024    BILT 1.0 05/30/2024    TP 7.0 05/30/2024    AST 22 05/30/2024    ALT 14 05/30/2024    INR 1.0 10/08/2015    T4F 0.9 05/30/2024    TSH 0.390 (L) 05/30/2024    MG 2.1 09/12/2023    CK 48 05/30/2024    B12 1,861 (H) 08/17/2023    ETOH <3 05/30/2024     No results found.        Assessment/Plan:     Acute cystitis without hematuria  UA +WBCs, +LE, await urine and blood culture  Lactic acid normal  Initial WBC 15  Continue rocephin    Cough  Check cxr     Altered mental status, unspecified altered mental status type  Possibly 2/2 infection, dehydration  Await official CT read    Ankle pain  Check xray    ALMA DELIA on CKD stage 3  Baseline Cr 0.8; on admission 1.12  Gentle IVF     Orthostatic hypotension  Likely a cause of her dizziness.    - 2D echo: unremarkable  - Ongoing  conservative measures at home  - C/w home fludrocortisone and Midodrine increased to 5 mg twice a day; should watch bps closely     Fatigue  Seems to be a chronic issue.  Likely multifactorial in the setting of aging, urinary incontinence leading to nocturia up to 4 times a night, poor p.o. intake.  - Chronic ongoing issue, will monitor with further resolution of UTI  - Liothyronine added for hypothyroidism     Urinary incontinence  Has been requiring the use of pads, most prominent at nighttime.  - Stop oxybutinin as not helping at this time     Neck pain  Related to a fall due to dizziness about 1 month ago.  Still some soreness in the occipital area, intermittent.  Has improved however  -Cervical x-ray did show spondylosis most prominent on at C4-C5, C5-C6, C6-C7  - Check CT neck today  - Trial of alternating Tylenol, ibuprofen.  Remained stable on current regimen.  Otherwise remained stable     Joint stiffness  Localized to both hands, ongoing for quite some time.  Suspect osteoarthritis as no evidence of tenosynovitis on my examination  - Check x-ray of the bilateral hands: Prior x-rays 5/2022 did demonstrate osteoarthritis  - BATSHEVA was 1: 80, though overall low suspicion for rheumatological disease  - Can perform home exercises in addition to continuing with Tylenol on an as-needed basis for symptom support     CKD stage IIIa  Stable, creatinine 0.88/eGFR 65 -> 0.84/eGFR 69  - We will monitor      Coronary artery disease  Hx of Calcium score > 400, follows with Dr. Jimenez, last seen 2/24/2022  -We will need to establish with a new cardiologist given Dr. Jimenez's recent MCFP  - Seems to be stable     Hypothyroidism  - Repeat TFTs to monitor stability at next follow-up visit  - Levothyroxine 88 mcg  - Liothyronine added 5 mcg     Chronic L1 compression fracture  - On home gabapentin 100 mg TID PRN  - Stable on current regimen     Depression  -Effexor 37.5 mg once a day.       Active tobacco use  - Evidence of  emphysema on CT calcium score 2/24/2022  -Discussed the need to stop smoking altogether, aware of the risk for lung cancer, COPD  -Recommended gradual weaning off of cigarettes by decreasing 1-2 cigarettes every 1-2 weeks.  -We also discussed nicotine replacement therapy  - Currently smoking about 7 cigarettes/day    **Certification      PHYSICIAN Certification of Need for Inpatient Hospitalization - Initial Certification    Patient will require inpatient services that will reasonably be expected to span two midnight's based on the clinical documentation in H+P.   Based on patients current state of illness, I anticipate that, after discharge, patient will require TBD.        Stefanie Treviño DO  5/31/2024      Electronically signed by Stefanie Treviño DO on 5/31/2024  7:12 AM           D/C Summary    No notes of this type exist for this encounter.     Physical Therapy Notes (last 72 hours)  Notes from 5/31/2024  1:36 PM through 6/3/2024  1:36 PM   No notes of this type exist for this encounter.        Occupational Therapy Notes (last 72 hours)      Occupational Therapy Note signed by Soha Espinoza OT at 5/31/2024  5:10 PM  Version 2 of 2    Author: Soha Espinoza OT Service: — Author Type: Occupational Therapist    Filed: 5/31/2024  5:10 PM Date of Service: 5/31/2024 10:34 AM Status: Addendum    : Soha Espinoza OT (Occupational Therapist)    Related Notes: Original Note by Soha Espinoza OT (Occupational Therapist) filed at 5/31/2024  5:09 PM       OCCUPATIONAL THERAPY EVALUATION - INPATIENT     Room Number: 545/545-A  Evaluation Date: 5/31/2024  Type of Evaluation: Initial  Presenting Problem: acute cystitis, multiple falls, posterior head pain, L ankle pain, r/o COVID-19  Hx OH+, CKD3, CAD, chronic L1 compression fx     Physician Order: IP Consult to Occupational Therapy  Reason for Therapy: ADL/IADL Dysfunction and Discharge Planning    OCCUPATIONAL THERAPY ASSESSMENT   Patient is a 84 year old female  admitted 5/30/2024 for acute cystitis, multiple falls, posterior head pain, L ankle pain, r/o COVID-19.  Patient reported she is independent with ADLs, except assist for bathing, and MI for fx mobility/transfers with an assistive device at baseline. Patient is currently functioning below baseline with ADLs and fx mobility/transfers.  Patient is requiring up to max/total assist for ADLs as a result of the following impairments: decreased functional strength, decreased functional reach, decreased endurance, pain, impaired balance, and decreased muscular endurance. Occupational Therapy will continue to follow for duration of hospitalization.    Patient will benefit from continued skilled OT Services to promote return to prior level of function and safety with continuous assistance and gradual rehabilitative therapy     PLAN  OT Treatment Plan: Balance activities;Energy conservation/work simplification techniques;ADL training;Functional transfer training;Endurance training;Patient/Family education;Equipment eval/education;Patient/Family training;Compensatory technique education     OCCUPATIONAL THERAPY MEDICAL/SOCIAL HISTORY   Problem List  Principal Problem:    Acute cystitis without hematuria  Active Problems:    Acute cough    Altered mental status, unspecified altered mental status type    Left ankle swelling    ALMA DELIA (acute kidney injury) ([facility])    HOME SITUATION  Type of Home: Independent living facility  Home Layout: One level  Lives With: Alone  Drives: No  Patient Regularly Uses: None    SUBJECTIVE  \"I'm hungry.\"    OCCUPATIONAL THERAPY EXAMINATION    OBJECTIVE  Precautions: None  Fall Risk: High fall risk    PAIN ASSESSMENT  Rating: -- (not rated)  Location: B knees  Management Techniques: Activity promotion; Body mechanics; Repositioning    COGNITION  Orientation Level:  oriented to person/place/situation/year, disoriented to month  Following Commands:  follows one step commands with  repetition    SENSATION  Light touch:  intact    RANGE OF MOTION   Upper extremity ROM is within functional limits     STRENGTH ASSESSMENT  Upper extremity strength is within functional limits     ACTIVITIES OF DAILY LIVING ASSESSMENT  AM-PAC ‘6-Clicks’ Inpatient Daily Activity Short Form  How much help from another person does the patient currently need…  -   Putting on and taking off regular lower body clothing?: A Lot  -   Bathing (including washing, rinsing, drying)?: A Lot  -   Toileting, which includes using toilet, bedpan or urinal? : A Lot  -   Putting on and taking off regular upper body clothing?: A Little  -   Taking care of personal grooming such as brushing teeth?: A Little  -   Eating meals?: A Little    AM-PAC Score:  Score: 15  Approx Degree of Impairment: 56.46%  Standardized Score (AM-PAC Scale): 34.69  CMS Modifier (G-Code): CK    BED MOBILITY  Supine <> Sit: Max assist x2  Comments:  Max assist progressing to CGA for static sitting balance at EOB    FUNCTIONAL TRANSFER ASSESSMENT  Unable to safely progress mobility at this time d/t pending L ankle XR.    FUNCTIONAL ADL ASSESSMENT  Eating: min assist   Grooming: min assist (per obs)   UB Dressing: mod assist  LB Dressing: max assist  Toileting: total assist (per obs)     EDUCATION PROVIDED  Patient: Role of Occupational Therapy; Plan of Care; Discharge Recommendations; Posture/Positioning; Proper Body Mechanics  Patient's Response to Education: Requires Further Education    The patient's Approx Degree of Impairment: 56.46% has been calculated based on documentation in the [facility] '6 clicks' Inpatient Daily Activity Short Form.  Research supports that patients with this level of impairment may benefit from rehab.  Final disposition will be made by interdisciplinary medical team.     Patient End of Session: Call light within reach;In bed;Needs met;RN aware of session/findings;All patient questions and concerns addressed;Alarm set    OT Goals  Patients  self stated goal is: none stated     Patient will complete functional transfer with Max A x1  Comment:     Patient will sit EOB with SUP in prep for ADLs  Comment:     Patient will tolerate standing for 1-2 minutes in prep for adls with Max A x1  Comment:    Patient will complete oral/facial grooming task in supported sitting with Setup Assist  Comment:            Goals  on: 24  Frequency: 3-5x/week    Patient Evaluation Complexity Level:   Occupational Profile/Medical History MODERATE - Expanded review of history including review of medical or therapy record   Specific performance deficits impacting engagement in ADL/IADL HIGH  5+ performance deficits    Client Assessment/Performance Deficits HIGH - Comorbidities and significant modifications of tasks    Clinical Decision Making MODERATE - Analysis of occupational profile, detailed assessments, several treatment options    Overall Complexity MODERATE     OT Session Time  Therapeutic Activity: 10 minutes    NIRAV Lombardo/L  [facility]  #[id number]         Occupational Therapy Note signed by Soha Espinoza OT at 2024  5:09 PM  Version 1 of 2    Author: Soha Espinoza OT Service: — Author Type: Occupational Therapist    Filed: 2024  5:09 PM Date of Service: 2024 10:34 AM Status: Signed    : Soha Espinoza OT (Occupational Therapist)    Related Notes: Addendum by Soha Espinoza OT (Occupational Therapist) filed at 2024  5:10 PM       OCCUPATIONAL THERAPY EVALUATION - INPATIENT     Room Number: 545/545-A  Evaluation Date: 2024  Type of Evaluation: Initial  Presenting Problem: acute cystitis, multiple falls, posterior head pain, L ankle pain, r/o COVID-19  Hx OH+, CKD3, CAD, chronic L1 compression fx     Physician Order: IP Consult to Occupational Therapy  Reason for Therapy: ADL/IADL Dysfunction and Discharge Planning    OCCUPATIONAL THERAPY ASSESSMENT   Patient is a 84 year old female admitted  5/30/2024 for acute cystitis, multiple falls, posterior head pain, L ankle pain, r/o COVID-19.  Patient reported she is independent with ADLs and MI for fx mobility/transfers with an assistive device at baseline. Patient is currently functioning below baseline with ADLs and fx mobility/transfers.  Patient is requiring up to max/total assist for ADLs as a result of the following impairments: decreased functional strength, decreased functional reach, decreased endurance, pain, impaired balance, and decreased muscular endurance. Occupational Therapy will continue to follow for duration of hospitalization.    Patient will benefit from continued skilled OT Services to promote return to prior level of function and safety with continuous assistance and gradual rehabilitative therapy     PLAN  OT Treatment Plan: Balance activities;Energy conservation/work simplification techniques;ADL training;Functional transfer training;Endurance training;Patient/Family education;Equipment eval/education;Patient/Family training;Compensatory technique education     OCCUPATIONAL THERAPY MEDICAL/SOCIAL HISTORY   Problem List  Principal Problem:    Acute cystitis without hematuria  Active Problems:    Acute cough    Altered mental status, unspecified altered mental status type    Left ankle swelling    ALMA DELIA (acute kidney injury) ([facility])    HOME SITUATION  Type of Home: Independent living facility  Home Layout: One level  Lives With: Alone  Drives: No  Patient Regularly Uses: None    SUBJECTIVE  \"I'm hungry.\"    OCCUPATIONAL THERAPY EXAMINATION    OBJECTIVE  Precautions: None  Fall Risk: High fall risk    PAIN ASSESSMENT  Rating: -- (not rated)  Location: B knees  Management Techniques: Activity promotion; Body mechanics; Repositioning    COGNITION  Orientation Level:  oriented to person/place/situation/year, disoriented to month  Following Commands:  follows one step commands with repetition    SENSATION  Light touch:  intact    RANGE OF MOTION    Upper extremity ROM is within functional limits     STRENGTH ASSESSMENT  Upper extremity strength is within functional limits     ACTIVITIES OF DAILY LIVING ASSESSMENT  AM-PAC ‘6-Clicks’ Inpatient Daily Activity Short Form  How much help from another person does the patient currently need…  -   Putting on and taking off regular lower body clothing?: A Lot  -   Bathing (including washing, rinsing, drying)?: A Lot  -   Toileting, which includes using toilet, bedpan or urinal? : A Lot  -   Putting on and taking off regular upper body clothing?: A Little  -   Taking care of personal grooming such as brushing teeth?: A Little  -   Eating meals?: A Little    AM-PAC Score:  Score: 15  Approx Degree of Impairment: 56.46%  Standardized Score (AM-PAC Scale): 34.69  CMS Modifier (G-Code): CK    BED MOBILITY  Supine <> Sit: Max assist x2  Comments:  Max assist progressing to CGA for static sitting balance at EOB    FUNCTIONAL TRANSFER ASSESSMENT  Unable to safely progress mobility at this time d/t pending L ankle XR.    FUNCTIONAL ADL ASSESSMENT  Eating: min assist   Grooming: min assist (per obs)   UB Dressing: mod assist  LB Dressing: max assist  Toileting: total assist (per obs)     EDUCATION PROVIDED  Patient: Role of Occupational Therapy; Plan of Care; Discharge Recommendations; Posture/Positioning; Proper Body Mechanics  Patient's Response to Education: Requires Further Education    The patient's Approx Degree of Impairment: 56.46% has been calculated based on documentation in the [facility] '6 clicks' Inpatient Daily Activity Short Form.  Research supports that patients with this level of impairment may benefit from rehab.  Final disposition will be made by interdisciplinary medical team.     Patient End of Session: Call light within reach;In bed;Needs met;RN aware of session/findings;All patient questions and concerns addressed;Alarm set    OT Goals  Patients self stated goal is: none stated     Patient will complete  functional transfer with Max A x1  Comment:     Patient will sit EOB with SUP in prep for ADLs  Comment:     Patient will tolerate standing for 1-2 minutes in prep for adls with Max A x1  Comment:    Patient will complete oral/facial grooming task in supported sitting with Setup Assist  Comment:            Goals  on: 24  Frequency: 3-5x/week    Patient Evaluation Complexity Level:   Occupational Profile/Medical History MODERATE - Expanded review of history including review of medical or therapy record   Specific performance deficits impacting engagement in ADL/IADL HIGH  5+ performance deficits    Client Assessment/Performance Deficits HIGH - Comorbidities and significant modifications of tasks    Clinical Decision Making MODERATE - Analysis of occupational profile, detailed assessments, several treatment options    Overall Complexity MODERATE     OT Session Time  Therapeutic Activity: 10 minutes    NIRAV Lombardo/L  [facility]  #[id number]               Video Swallow Study Notes    No notes of this type exist for this encounter.     SLP Notes    No notes of this type exist for this encounter.     Immunizations     Name Date      Covid-19 Pfizer 22     Covid-19 Pfizer 22     Covid-19 Pfizer 21     Covid-19 Pfizer 21     INFLUENZA 22     INFLUENZA 11/10/18     INFLUENZA 11/10/18     INFLUENZA 18     INFLUENZA 18     INFLUENZA 10/11/15     INFLUENZA 10/26/14     Pneumococcal (Prevnar 13) 10/26/14     Pneumovax 23 18     Pneumovax 23 10/11/15       Multidisciplinary Problems     Active Goals        Problem: Patient/Family Goals    Goal Priority Disciplines Outcome Interventions   Patient/Family Long Term Goal     Interdisciplinary Progressing    Description: Patient's Long Term Goal: ***    Interventions:  - ***  - See additional Care Plan goals for specific interventions   Patient/Family Short Term Goal     Interdisciplinary Progressing     Description: Patient's Short Term Goal: ***    Interventions:   - ***  - See additional Care Plan goals for specific interventions               Discharge Treatment Preferences    Flowsheet Row Most Recent Value   Preferences    [facility]   PAS[facility] Level 1 Submitted Yes